# Patient Record
Sex: MALE | Race: WHITE | NOT HISPANIC OR LATINO | Employment: OTHER | URBAN - NONMETROPOLITAN AREA
[De-identification: names, ages, dates, MRNs, and addresses within clinical notes are randomized per-mention and may not be internally consistent; named-entity substitution may affect disease eponyms.]

---

## 2023-01-27 PROBLEM — G60.0 CMT (CHARCOT-MARIE-TOOTH DISEASE): Status: ACTIVE | Noted: 2023-01-27

## 2023-01-27 PROBLEM — J30.1 ALLERGIC RHINITIS DUE TO POLLEN: Status: ACTIVE | Noted: 2023-01-27

## 2023-01-27 PROBLEM — F32.A CHRONIC DEPRESSION: Status: ACTIVE | Noted: 2023-01-27

## 2023-01-27 PROBLEM — B34.9 VIRAL SYNDROME: Status: ACTIVE | Noted: 2023-01-27

## 2023-01-27 PROBLEM — K21.9 ESOPHAGEAL REFLUX: Status: ACTIVE | Noted: 2023-01-27

## 2023-01-27 PROBLEM — G62.9 PERIPHERAL NEUROPATHY: Status: ACTIVE | Noted: 2023-01-27

## 2023-01-27 PROBLEM — E03.9 HYPOTHYROIDISM: Status: ACTIVE | Noted: 2023-01-27

## 2023-01-27 PROBLEM — E78.00 HYPERCHOLESTEREMIA: Status: ACTIVE | Noted: 2023-01-27

## 2023-01-27 PROBLEM — G47.33 OBSTRUCTIVE SLEEP APNEA: Status: ACTIVE | Noted: 2023-01-27

## 2023-01-27 PROBLEM — G47.9: Status: ACTIVE | Noted: 2023-01-27

## 2023-01-27 PROBLEM — R35.0 URINARY FREQUENCY: Status: ACTIVE | Noted: 2023-01-27

## 2023-01-27 PROBLEM — L30.9 DERMATITIS, ECZEMATOID: Status: ACTIVE | Noted: 2023-01-27

## 2023-01-27 PROBLEM — E66.3 OVERWEIGHT WITH BODY MASS INDEX (BMI) OF 29 TO 29.9 IN ADULT: Status: ACTIVE | Noted: 2023-01-27

## 2023-01-27 PROBLEM — G47.00 INSOMNIA: Status: ACTIVE | Noted: 2023-01-27

## 2023-01-27 PROBLEM — E11.9 CONTROLLED TYPE 2 DIABETES MELLITUS (MULTI): Status: ACTIVE | Noted: 2023-01-27

## 2023-01-27 PROBLEM — E55.9 VITAMIN D DEFICIENCY: Status: ACTIVE | Noted: 2023-01-27

## 2023-01-27 PROBLEM — I10 BENIGN ESSENTIAL HYPERTENSION: Status: ACTIVE | Noted: 2023-01-27

## 2023-01-27 PROBLEM — N40.0 BPH (BENIGN PROSTATIC HYPERPLASIA): Status: ACTIVE | Noted: 2023-01-27

## 2023-01-27 PROBLEM — M15.9 OSTEOARTHRITIS, MULTIPLE SITES: Status: ACTIVE | Noted: 2023-01-27

## 2023-01-27 PROBLEM — M62.838 MUSCLE SPASM OF BOTH LOWER LEGS: Status: ACTIVE | Noted: 2023-01-27

## 2023-01-27 PROBLEM — R63.1 POLYDIPSIA: Status: ACTIVE | Noted: 2023-01-27

## 2023-01-27 RX ORDER — NABUMETONE 750 MG/1
1 TABLET, FILM COATED ORAL EVERY 12 HOURS
COMMUNITY
Start: 2012-05-22 | End: 2024-01-17

## 2023-01-27 RX ORDER — FLUTICASONE PROPIONATE 50 MCG
2 SPRAY, SUSPENSION (ML) NASAL DAILY
COMMUNITY
Start: 2016-03-30 | End: 2023-03-10

## 2023-01-27 RX ORDER — PREGABALIN 75 MG/1
1 CAPSULE ORAL 3 TIMES DAILY
COMMUNITY
Start: 2020-10-05 | End: 2023-06-28

## 2023-01-27 RX ORDER — BLOOD-GLUCOSE METER
KIT MISCELLANEOUS
COMMUNITY
Start: 2021-06-03

## 2023-01-27 RX ORDER — LEVOTHYROXINE SODIUM 100 UG/1
1 TABLET ORAL DAILY
COMMUNITY
Start: 2012-04-11 | End: 2023-07-21

## 2023-01-27 RX ORDER — TRAMADOL HYDROCHLORIDE 50 MG/1
50 TABLET ORAL EVERY 6 HOURS PRN
COMMUNITY
Start: 2012-05-22 | End: 2023-03-29

## 2023-01-27 RX ORDER — ERGOCALCIFEROL 1.25 MG/1
1 CAPSULE ORAL
COMMUNITY
Start: 2021-06-03 | End: 2023-12-04

## 2023-01-27 RX ORDER — ATORVASTATIN CALCIUM 80 MG/1
1 TABLET, FILM COATED ORAL NIGHTLY
COMMUNITY
Start: 2012-05-22 | End: 2024-01-17

## 2023-01-27 RX ORDER — CETIRIZINE HYDROCHLORIDE 10 MG/1
1 TABLET ORAL DAILY
COMMUNITY
Start: 2016-03-30

## 2023-01-27 RX ORDER — ASPIRIN 81 MG/1
1 TABLET ORAL DAILY
COMMUNITY
Start: 2022-03-10

## 2023-01-27 RX ORDER — AMLODIPINE BESYLATE 5 MG/1
1 TABLET ORAL DAILY
COMMUNITY
Start: 2019-12-20 | End: 2023-03-10 | Stop reason: DRUGHIGH

## 2023-01-27 RX ORDER — CLOBETASOL PROPIONATE 0.5 MG/G
CREAM TOPICAL
COMMUNITY
Start: 2019-03-18

## 2023-01-27 RX ORDER — IBUPROFEN 200 MG
CAPSULE ORAL
COMMUNITY
Start: 2018-01-12

## 2023-01-27 RX ORDER — OMEPRAZOLE 20 MG/1
1 CAPSULE, DELAYED RELEASE ORAL DAILY
COMMUNITY
Start: 2016-06-30

## 2023-01-27 RX ORDER — TAMSULOSIN HYDROCHLORIDE 0.4 MG/1
CAPSULE ORAL
COMMUNITY
End: 2023-03-10 | Stop reason: SDUPTHER

## 2023-01-27 RX ORDER — METFORMIN HYDROCHLORIDE 500 MG/1
1000 TABLET ORAL
COMMUNITY
Start: 2021-06-03 | End: 2023-12-26 | Stop reason: SDUPTHER

## 2023-03-10 ENCOUNTER — OFFICE VISIT (OUTPATIENT)
Dept: PRIMARY CARE | Facility: CLINIC | Age: 63
End: 2023-03-10
Payer: COMMERCIAL

## 2023-03-10 VITALS
DIASTOLIC BLOOD PRESSURE: 68 MMHG | HEIGHT: 69 IN | BODY MASS INDEX: 29.41 KG/M2 | OXYGEN SATURATION: 97 % | WEIGHT: 198.6 LBS | RESPIRATION RATE: 16 BRPM | HEART RATE: 97 BPM | SYSTOLIC BLOOD PRESSURE: 122 MMHG

## 2023-03-10 DIAGNOSIS — G47.33 OBSTRUCTIVE SLEEP APNEA: ICD-10-CM

## 2023-03-10 DIAGNOSIS — I10 BENIGN ESSENTIAL HYPERTENSION: Primary | ICD-10-CM

## 2023-03-10 DIAGNOSIS — N40.0 BENIGN PROSTATIC HYPERPLASIA WITHOUT LOWER URINARY TRACT SYMPTOMS: ICD-10-CM

## 2023-03-10 DIAGNOSIS — F32.A CHRONIC DEPRESSION: ICD-10-CM

## 2023-03-10 DIAGNOSIS — E11.9 CONTROLLED TYPE 2 DIABETES MELLITUS WITHOUT COMPLICATION, WITHOUT LONG-TERM CURRENT USE OF INSULIN (MULTI): ICD-10-CM

## 2023-03-10 DIAGNOSIS — E03.9 HYPOTHYROIDISM, UNSPECIFIED TYPE: ICD-10-CM

## 2023-03-10 DIAGNOSIS — G60.0 CMT (CHARCOT-MARIE-TOOTH DISEASE): ICD-10-CM

## 2023-03-10 PROCEDURE — 3074F SYST BP LT 130 MM HG: CPT | Performed by: FAMILY MEDICINE

## 2023-03-10 PROCEDURE — 99214 OFFICE O/P EST MOD 30 MIN: CPT | Performed by: FAMILY MEDICINE

## 2023-03-10 PROCEDURE — 1036F TOBACCO NON-USER: CPT | Performed by: FAMILY MEDICINE

## 2023-03-10 PROCEDURE — 3078F DIAST BP <80 MM HG: CPT | Performed by: FAMILY MEDICINE

## 2023-03-10 RX ORDER — AMLODIPINE BESYLATE 5 MG/1
2.5 TABLET ORAL DAILY
Qty: 45 TABLET | Refills: 1 | Status: SHIPPED | OUTPATIENT
Start: 2023-03-10 | End: 2023-03-10 | Stop reason: DRUGHIGH

## 2023-03-10 RX ORDER — AMLODIPINE BESYLATE 5 MG/1
2.5 TABLET ORAL DAILY
COMMUNITY
End: 2023-04-04

## 2023-03-10 RX ORDER — AMLODIPINE BESYLATE 5 MG/1
2.5 TABLET ORAL DAILY
Qty: 45 TABLET | Refills: 1 | Status: SHIPPED | OUTPATIENT
Start: 2023-03-10 | End: 2023-03-10 | Stop reason: SINTOL

## 2023-03-10 RX ORDER — TAMSULOSIN HYDROCHLORIDE 0.4 MG/1
0.4 CAPSULE ORAL DAILY
Qty: 90 CAPSULE | Refills: 3 | Status: SHIPPED | OUTPATIENT
Start: 2023-03-10 | End: 2024-03-01
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
## 2023-03-29 ENCOUNTER — TELEPHONE (OUTPATIENT)
Dept: PRIMARY CARE | Facility: CLINIC | Age: 63
End: 2023-03-29
Payer: COMMERCIAL

## 2023-03-29 DIAGNOSIS — M15.9 PRIMARY OSTEOARTHRITIS INVOLVING MULTIPLE JOINTS: Primary | ICD-10-CM

## 2023-03-29 RX ORDER — TRAMADOL HYDROCHLORIDE 50 MG/1
TABLET ORAL
Qty: 360 TABLET | Refills: 0 | Status: SHIPPED | OUTPATIENT
Start: 2023-03-29 | End: 2023-07-11

## 2023-03-31 NOTE — TELEPHONE ENCOUNTER
This is [facility] calling.  The pregabalin 75mg is no longer covered by the patient's insurance.  The preferred is gabapentin tablets or capsules.  Please send an e script OR call us using reference number [id number] at [phone].

## 2023-04-03 ENCOUNTER — PATIENT MESSAGE (OUTPATIENT)
Dept: PRIMARY CARE | Facility: CLINIC | Age: 63
End: 2023-04-03
Payer: COMMERCIAL

## 2023-04-04 DIAGNOSIS — I10 BENIGN ESSENTIAL HYPERTENSION: Primary | ICD-10-CM

## 2023-04-04 RX ORDER — AMLODIPINE BESYLATE 5 MG/1
TABLET ORAL
Qty: 90 TABLET | Refills: 3 | Status: SHIPPED | OUTPATIENT
Start: 2023-04-04 | End: 2023-10-02 | Stop reason: SINTOL

## 2023-06-12 ENCOUNTER — APPOINTMENT (OUTPATIENT)
Dept: PRIMARY CARE | Facility: CLINIC | Age: 63
End: 2023-06-12
Payer: COMMERCIAL

## 2023-06-27 DIAGNOSIS — G62.89 OTHER POLYNEUROPATHY: Primary | ICD-10-CM

## 2023-06-28 RX ORDER — PREGABALIN 75 MG/1
CAPSULE ORAL
Qty: 270 CAPSULE | Refills: 1 | Status: SHIPPED | OUTPATIENT
Start: 2023-06-28 | End: 2023-12-28

## 2023-06-29 ENCOUNTER — OFFICE VISIT (OUTPATIENT)
Dept: PRIMARY CARE | Facility: CLINIC | Age: 63
End: 2023-06-29
Payer: COMMERCIAL

## 2023-06-29 VITALS
BODY MASS INDEX: 29.09 KG/M2 | DIASTOLIC BLOOD PRESSURE: 80 MMHG | HEART RATE: 86 BPM | WEIGHT: 197 LBS | SYSTOLIC BLOOD PRESSURE: 128 MMHG | OXYGEN SATURATION: 97 %

## 2023-06-29 DIAGNOSIS — G60.0 CMT (CHARCOT-MARIE-TOOTH DISEASE): ICD-10-CM

## 2023-06-29 DIAGNOSIS — F32.A CHRONIC DEPRESSION: ICD-10-CM

## 2023-06-29 DIAGNOSIS — Z79.899 ENCOUNTER FOR LONG-TERM (CURRENT) USE OF HIGH-RISK MEDICATION: Primary | ICD-10-CM

## 2023-06-29 DIAGNOSIS — I10 BENIGN ESSENTIAL HYPERTENSION: ICD-10-CM

## 2023-06-29 DIAGNOSIS — E11.9 CONTROLLED TYPE 2 DIABETES MELLITUS WITHOUT COMPLICATION, WITHOUT LONG-TERM CURRENT USE OF INSULIN (MULTI): ICD-10-CM

## 2023-06-29 DIAGNOSIS — G47.33 OBSTRUCTIVE SLEEP APNEA: ICD-10-CM

## 2023-06-29 DIAGNOSIS — E03.9 HYPOTHYROIDISM, UNSPECIFIED TYPE: ICD-10-CM

## 2023-06-29 LAB
ALANINE AMINOTRANSFERASE (SGPT) (U/L) IN SER/PLAS: 34 U/L (ref 10–52)
ALBUMIN (G/DL) IN SER/PLAS: 4 G/DL (ref 3.4–5)
ALKALINE PHOSPHATASE (U/L) IN SER/PLAS: 74 U/L (ref 33–136)
ANION GAP IN SER/PLAS: 13 MMOL/L (ref 10–20)
ASPARTATE AMINOTRANSFERASE (SGOT) (U/L) IN SER/PLAS: 22 U/L (ref 9–39)
BILIRUBIN TOTAL (MG/DL) IN SER/PLAS: 0.8 MG/DL (ref 0–1.2)
CALCIUM (MG/DL) IN SER/PLAS: 8.5 MG/DL (ref 8.6–10.3)
CARBON DIOXIDE, TOTAL (MMOL/L) IN SER/PLAS: 25 MMOL/L (ref 21–32)
CHLORIDE (MMOL/L) IN SER/PLAS: 103 MMOL/L (ref 98–107)
CHOLESTEROL (MG/DL) IN SER/PLAS: 120 MG/DL (ref 0–199)
CHOLESTEROL IN HDL (MG/DL) IN SER/PLAS: 33.1 MG/DL
CHOLESTEROL/HDL RATIO: 3.6
CREATININE (MG/DL) IN SER/PLAS: 0.9 MG/DL (ref 0.5–1.3)
GFR MALE: >90 ML/MIN/1.73M2
GLUCOSE (MG/DL) IN SER/PLAS: 125 MG/DL (ref 74–99)
LDL: 68 MG/DL (ref 0–99)
POC HEMOGLOBIN A1C: 6.4 % (ref 4.2–6.5)
POTASSIUM (MMOL/L) IN SER/PLAS: 4 MMOL/L (ref 3.5–5.3)
PROTEIN TOTAL: 6.4 G/DL (ref 6.4–8.2)
SODIUM (MMOL/L) IN SER/PLAS: 137 MMOL/L (ref 136–145)
THYROTROPIN (MIU/L) IN SER/PLAS BY DETECTION LIMIT <= 0.05 MIU/L: 1.89 MIU/L (ref 0.44–3.98)
TRIGLYCERIDE (MG/DL) IN SER/PLAS: 95 MG/DL (ref 0–149)
UREA NITROGEN (MG/DL) IN SER/PLAS: 12 MG/DL (ref 6–23)
VLDL: 19 MG/DL (ref 0–40)

## 2023-06-29 PROCEDURE — 80346 BENZODIAZEPINES1-12: CPT

## 2023-06-29 PROCEDURE — 80354 DRUG SCREENING FENTANYL: CPT

## 2023-06-29 PROCEDURE — 3079F DIAST BP 80-89 MM HG: CPT | Performed by: FAMILY MEDICINE

## 2023-06-29 PROCEDURE — 80361 OPIATES 1 OR MORE: CPT

## 2023-06-29 PROCEDURE — 3074F SYST BP LT 130 MM HG: CPT | Performed by: FAMILY MEDICINE

## 2023-06-29 PROCEDURE — 80061 LIPID PANEL: CPT

## 2023-06-29 PROCEDURE — 80368 SEDATIVE HYPNOTICS: CPT

## 2023-06-29 PROCEDURE — 80365 DRUG SCREENING OXYCODONE: CPT

## 2023-06-29 PROCEDURE — 83036 HEMOGLOBIN GLYCOSYLATED A1C: CPT | Performed by: FAMILY MEDICINE

## 2023-06-29 PROCEDURE — 82043 UR ALBUMIN QUANTITATIVE: CPT

## 2023-06-29 PROCEDURE — 80053 COMPREHEN METABOLIC PANEL: CPT

## 2023-06-29 PROCEDURE — 80307 DRUG TEST PRSMV CHEM ANLYZR: CPT

## 2023-06-29 PROCEDURE — 99214 OFFICE O/P EST MOD 30 MIN: CPT | Performed by: FAMILY MEDICINE

## 2023-06-29 PROCEDURE — 82570 ASSAY OF URINE CREATININE: CPT

## 2023-06-29 PROCEDURE — 1036F TOBACCO NON-USER: CPT | Performed by: FAMILY MEDICINE

## 2023-06-29 PROCEDURE — 80373 DRUG SCREENING TRAMADOL: CPT

## 2023-06-29 PROCEDURE — 84443 ASSAY THYROID STIM HORMONE: CPT

## 2023-06-29 PROCEDURE — 80358 DRUG SCREENING METHADONE: CPT
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
## 2023-06-29 NOTE — PATIENT INSTRUCTIONS
"Look into if Prevnar 20 is covered for you - we can do it at the next appt if it is       Check with your insurance on the Shingrix vaccine-  see if they cover it and where they want you to get it.   If our office - you can make a nurse appt for it -  let me know and I will place the order after you have an appt.   If the pharmacy - then check with the pharmacy to see if you need an appointment.    Its two injections -  2 - 6 months apart.         You will always hear about your test results.       The easiest way, if you have a smart phone or computer access, is to sign up for \"My Chart.\"    The electronic way to see your medical record, test results and visit summaries/instructions.   You will see your test results on this system before I do.   But, I will always make comments on the results when I see them.   If over a week goes by and I have not made comments on them,  please let me know.    Something may have gone wrong and I did not see them.    If you are having issues with getting onto My Chart , the patient support  number is [phone].       If you do not have a smart phone or computer access, I can still leave test results on your Secureach card if you have one,   or we can call or mail you your results.   IF a week goes by and you have not heard about your results,  please let me know.      Sleep Hygiene:     I WOULD LOVE FOR YOU TO SEE DR PENN A SLEEP SPECIALIST     If you are having trouble sleeping, you will want to follow these tips:    1) Have a set bedtime and awakening time that you want to stick to every night.      2)  Avoid caffeine - some people can just stop 4 - 5 hours before bedtime, but many people are sensitive to even a small amount in the day.  Remember -  coffee, some teas, energy drinks, diet medications, and chocolate are all known to contain caffeine.       3) Create a \"think about tomorrow\" notepad to keep by your bed.  When you are lying in bed thinking about something that you " "can't get out of your mind, write it down.  This helps your brain \"let it go\" for the night.      4)  A cool room often helps.      5) If you are lying in bed and just can't fall asleep, then get out of bed.  Find a calm and quiet place, and read something that is VERY BORING until you feel sleepy.  Then go back to bed.  Keep this pattern up until you fall asleep.  Don't forget to wake up at your set awakening time - even if you only fell asleep 2 hours before!   This will help you fall asleep the next night.       6) No napping in the day.      7) White noise often helps - a fan usually works great     8) And finally  - no electronics one hour before bed, and definitely not any in the night.  The light from them stimulates the awake center if the brain.          For General Healthy Nutrition    (Remember - NOT A DIET!   Diets are only good for class reunions.)    These are my general good nutrition recommendations for most people.   I use the term \" diet \"  in these instructions to mean your overall nutrition - how you eat and drink.   If we talked about something different during your visit with me,  other than what is written below,  follow that advice instead.       For most people,  eating healthier means getting less added sugar and less processed foods in your diet    The fresher the better.    Added sugar is now a part of the nutrition label on manufactured food, so you can keep an eye on it easier.    But basically,  foods and beverages  that contain regular sugar and corn syrup are the main sources of added sugars.  Eating as little of these foods as you can is best.   One shocking example of the epidemic of added sugar is soda.    One can of regular soda contains about as much added sugar as 3 regular size doughnuts!     The other issue with processed foods is the amount of processed grains they contain , such as white flour.    This is also something you want to try to limit in your diet.     But, grain " products are very important for your nutrition.    Whole grains are better for your body.     Cutting back on white breads, traditional pasta, baked goods, white rice,  and processed cereals will be healthier for you.   The better choices include whole grain breads,  whole wheat pasta,  brown rice, quinoa, barley, steel cut  or rolled oats.   If you eat cereal for breakfast, try to look for one made with whole grains and less sugar.   There are many people who have a problem with gluten, for a large variety of reasons.    Generally,  products made with wheat flour , barley or rye are the primary source of gluten.       Cutting back on saturated fats is important.    You want the majority of the meat that you eat to be chicken, fish or turkey.   Baked or broiled is best -  fried adds too much fat.    There are healthy fats that are important - fat is important for holding down appetite, vitamin absorption and several metabolic processes in the body.  Monounsaturated fats raise HDL (good cholesterol) and lower LDL (bad cholesterol).   Olive oil, peanut oil, nuts, seeds, and avocados are great sources of the good fats.       Ideas are:   Trade sour cream dip for hummus (which is rich in olive oil) or guacamole; use veggies or whole-wheat chips to dip.    Nuts are an excellent source of protein and healthy fats.   Tree nuts are the best kind, such as almonds or walnuts.   Just be careful - they are high in calories, so stick to a serving size.  (Most are about 200 calories for a 1/4 cup)      Proteins are very important for your body, and they also hold down your appetite.   Try to have protein with every meal.    These generally are meats, nuts, many beans, legumes, eggs, and dairy.   You will find protein in whole grain products and some green vegetables have a little too.     When you have dairy (if you can - many people are lactose intolerant) try to make it low fat.    Ideas are 1% milk, lowfat yogurt or cheeses,  "low fat cottage cheese.   I don't generally recommend FAT FREE because they often contain artificial products to improve taste, and the fat helps hold down your appetite.   If you are lactose intolerant, try to see if taking Lactaid before having dairy helps.      Fresh fruits and vegetables are VERY important.  The brighter the better.   Many vegetables are considered \"Free Foods\" - meaning you can eat as much as you want, and it does not matter.  These include tomatoes, cucumbers, celery, peppers, all the various lettuces and kale - to name a few.   Potatoes, corn and peas are starchy, so do have more calories, but are still healthy - you just want to watch the amount of them you eat.       Fruits are full of wonderful nutrition.   They contain natural sugar called fructose, so eating them in moderation is best.   Diabetics may need to pay careful attention to how their body reacts to the sugar.  Some fruits might drastically increase their blood sugar.      Eating smaller meals with a couple of small snacks is better for your metabolism than not eating for long amounts of time  (breakfast is very important).   Trying to avoid large meals is helpful too.    Eating like this helps keep your appetite down and keeps you in burning metabolism rather than storage metabolism so your body will use the calories you eat.       I do not tell people to stop eating sweets or snack foods - just limit the amounts you have.  The less the better.   Pay attention to serving sizes, and treat them as a treat.        Foods like doughnuts, pop tarts, sugar cereals, cookies  ARE NOT GOOD FOR BREAKFAST.   They are loaded with sugar and will cause you to be hungrier in the day and often not feel well.    Caffeine needs to be limited - no more than 2 servings a day.  Some people can't have any at all.    (if you have any sleep or anxiety issues - stop the caffeine)   Coffee, many teas, many sodas, energy drinks, almost any diet " supplement,  and chocolate all contain caffeine.      Water is important.   For most people, 8   x  8 ounces  a day are needed.  This may vary for some health issues.    If you need to be on a low sodium diet, that means looking at labels and eating only 1000 - 2000 mg of sodium a day.    Calcium intake is important.  3 servings of a high calcium food or drink a day is recommended.   This is usually a cup of milk, a cup of yogurt, an ounce of a hard cheese or 1.5 ounces of a soft cheese are the usual servings.   There are other high calcium foods - including soy or almond milk, broccoli,  almonds, dark green leafy vegetable.   Make sure you are not getting more than 1000  - 1200 mg of total calcium a day (unless you have been told you need more by a doctor).    Vitamin D 3 is important to absorb the calcium and for your immune system.   For children, 400 IU a day is recommended.   For adults - 800 - 5000 IU a day  is recommended.  (Often the amount needed is individualized for adults - be sure to ask how much is right for you)    Physical activity is very important for good health.    Finding activities that give you regular exercise is very important for good health.  Try to find exercise you enjoy doing on a regular basis.    30 minutes at least 5 days a week of a good cardiovascular exercise is recommended.   That means something that gets your heart rate going faster than your usual baseline and you can find yourself breathing harder than usual while you are exercising.  If you have not done any exercise in a long time,  make sure you ask if its safe for you to start,  and be sure to gradually work up to your goal.      If you need to lose weight,  following these recommendations will help you.   And if you are doing all of this and still not losing weight, then its likely just the amount of food you are eating.   Learn to cut back on portion sizes.  Using smaller plates may help.  Healthy weight loss is  only  about a pound a week.   You have to remember that whatever you do to lose the weight, you must be prepared to keep it up for life for the weight to stay off.     A lot of people have a lot of luck with using something like a fit bit,  or a program where you keep track of all of your calories that you eat and what you burn off in the day.

## 2023-06-30 LAB
ALBUMIN (MG/L) IN URINE: <7 MG/L
ALBUMIN/CREATININE (UG/MG) IN URINE: NORMAL UG/MG CRT (ref 0–30)
CREATININE (MG/DL) IN URINE: 35.5 MG/DL (ref 20–370)

## 2023-07-03 LAB
6-ACETYLMORPHINE: <25 NG/ML
7-AMINOCLONAZEPAM: <25 NG/ML
ALPHA-HYDROXYALPRAZOLAM: <25 NG/ML
ALPHA-HYDROXYMIDAZOLAM: <25 NG/ML
ALPRAZOLAM: <25 NG/ML
AMPHETAMINE (PRESENCE) IN URINE BY SCREEN METHOD: ABNORMAL
BARBITURATES PRESENCE IN URINE BY SCREEN METHOD: ABNORMAL
CANNABINOIDS IN URINE BY SCREEN METHOD: ABNORMAL
CHLORDIAZEPOXIDE: <25 NG/ML
CLONAZEPAM: <25 NG/ML
COCAINE (PRESENCE) IN URINE BY SCREEN METHOD: ABNORMAL
CODEINE: <50 NG/ML
CREATINE, URINE FOR DRUG: 35.4 MG/DL
DIAZEPAM: <25 NG/ML
DRUG SCREEN COMMENT URINE: ABNORMAL
EDDP: <25 NG/ML
FENTANYL CONFIRMATION, URINE: <2.5 NG/ML
HYDROCODONE: <25 NG/ML
HYDROMORPHONE: <25 NG/ML
LORAZEPAM: <25 NG/ML
METHADONE CONFIRMATION,URINE: <25 NG/ML
MIDAZOLAM: <25 NG/ML
MORPHINE URINE: <50 NG/ML
NORDIAZEPAM: <25 NG/ML
NORFENTANYL: <2.5 NG/ML
NORHYDROCODONE: <25 NG/ML
NOROXYCODONE: <25 NG/ML
O-DESMETHYLTRAMADOL: >1000 NG/ML
OXAZEPAM: <25 NG/ML
OXYCODONE: <25 NG/ML
OXYMORPHONE: <25 NG/ML
PHENCYCLIDINE (PRESENCE) IN URINE BY SCREEN METHOD: ABNORMAL
TEMAZEPAM: <25 NG/ML
TRAMADOL: >1000 NG/ML
ZOLPIDEM METABOLITE (ZCA): <25 NG/ML
ZOLPIDEM: <25 NG/ML

## 2023-07-10 DIAGNOSIS — M15.9 PRIMARY OSTEOARTHRITIS INVOLVING MULTIPLE JOINTS: ICD-10-CM

## 2023-07-11 RX ORDER — TRAMADOL HYDROCHLORIDE 50 MG/1
TABLET ORAL
Qty: 360 TABLET | Refills: 0 | Status: SHIPPED | OUTPATIENT
Start: 2023-07-11 | End: 2023-11-14

## 2023-07-21 DIAGNOSIS — E03.9 HYPOTHYROIDISM, UNSPECIFIED TYPE: Primary | ICD-10-CM

## 2023-07-21 RX ORDER — LEVOTHYROXINE SODIUM 100 UG/1
100 TABLET ORAL DAILY
Qty: 90 TABLET | Refills: 3 | Status: SHIPPED | OUTPATIENT
Start: 2023-07-21

## 2023-10-02 ENCOUNTER — OFFICE VISIT (OUTPATIENT)
Dept: PRIMARY CARE | Facility: CLINIC | Age: 63
End: 2023-10-02
Payer: COMMERCIAL

## 2023-10-02 VITALS
BODY MASS INDEX: 28.35 KG/M2 | DIASTOLIC BLOOD PRESSURE: 64 MMHG | SYSTOLIC BLOOD PRESSURE: 112 MMHG | OXYGEN SATURATION: 100 % | HEART RATE: 102 BPM | WEIGHT: 192 LBS

## 2023-10-02 DIAGNOSIS — I48.91 ATRIAL FIBRILLATION, UNSPECIFIED TYPE (MULTI): ICD-10-CM

## 2023-10-02 DIAGNOSIS — I10 BENIGN ESSENTIAL HYPERTENSION: Primary | ICD-10-CM

## 2023-10-02 DIAGNOSIS — I49.9 IRREGULAR HEART BEAT: ICD-10-CM

## 2023-10-02 DIAGNOSIS — E11.8 CONTROLLED TYPE 2 DIABETES MELLITUS WITH COMPLICATION, WITHOUT LONG-TERM CURRENT USE OF INSULIN (MULTI): ICD-10-CM

## 2023-10-02 DIAGNOSIS — G47.33 OBSTRUCTIVE SLEEP APNEA: ICD-10-CM

## 2023-10-02 PROCEDURE — 90686 IIV4 VACC NO PRSV 0.5 ML IM: CPT | Performed by: FAMILY MEDICINE

## 2023-10-02 PROCEDURE — 1036F TOBACCO NON-USER: CPT | Performed by: FAMILY MEDICINE

## 2023-10-02 PROCEDURE — 93000 ELECTROCARDIOGRAM COMPLETE: CPT | Performed by: FAMILY MEDICINE

## 2023-10-02 PROCEDURE — 90471 IMMUNIZATION ADMIN: CPT | Performed by: FAMILY MEDICINE

## 2023-10-02 PROCEDURE — 99215 OFFICE O/P EST HI 40 MIN: CPT | Performed by: FAMILY MEDICINE

## 2023-10-02 PROCEDURE — 3078F DIAST BP <80 MM HG: CPT | Performed by: FAMILY MEDICINE

## 2023-10-02 PROCEDURE — 3074F SYST BP LT 130 MM HG: CPT | Performed by: FAMILY MEDICINE

## 2023-10-02 RX ORDER — AMLODIPINE BESYLATE 2.5 MG/1
2.5 TABLET ORAL DAILY
Qty: 90 TABLET | Refills: 3 | Status: SHIPPED | OUTPATIENT
Start: 2023-10-02 | End: 2023-10-02 | Stop reason: ALTCHOICE

## 2023-10-02 RX ORDER — METOPROLOL TARTRATE 25 MG/1
25 TABLET, FILM COATED ORAL 2 TIMES DAILY
Qty: 60 TABLET | Refills: 5 | Status: SHIPPED | OUTPATIENT
Start: 2023-10-02 | End: 2023-11-02
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
## 2023-10-02 NOTE — PATIENT INSTRUCTIONS
"Start Metoprolol 25 mg twice a day -   You need to see if this will calm the heart rate down -   You need to be able to listen to your heart to see if this has worked -   I need to hear from you if not    If you start getting chest pain or shortness of breat - call 911.     We also have to start Eliquis 5 mg  - one every 12 hours to thin the blood -   You can stop the baby aspirin a day .      Stop caffeine  - or at least cut down     Work on better sleep and wearing the CPAP     Sleep Hygiene:     If you are having trouble sleeping, you will want to follow these tips:    1) Have a set bedtime and awakening time that you want to stick to every night.      2)  Avoid caffeine - some people can just stop 4 - 5 hours before bedtime, but many people are sensitive to even a small amount in the day.  Remember -  coffee, some teas, energy drinks, diet medications, and chocolate are all known to contain caffeine.       3) Create a \"think about tomorrow\" notepad to keep by your bed.  When you are lying in bed thinking about something that you can't get out of your mind, write it down.  This helps your brain \"let it go\" for the night.      4)  A cool room often helps.      5) If you are lying in bed and just can't fall asleep, then get out of bed.  Find a calm and quiet place, and read something that is VERY BORING until you feel sleepy.  Then go back to bed.  Keep this pattern up until you fall asleep.  Don't forget to wake up at your set awakening time - even if you only fell asleep 2 hours before!   This will help you fall asleep the next night.       6) No napping in the day.      7) White noise often helps - a fan usually works great     8) And finally  - no electronics one hour before bed, and definitely not any in the night.  The light from them stimulates the awake center if the brain.         Let me know tomorrow what the heart is doing - LEAVE ME A MESSAGE ON THE VOICEMAIL         You will always hear about your test " "results.     The easiest way, if you have a smart phone or computer access, is to sign up for \"My Chart.\"    The electronic way to see your medical record, test results and visit summaries/instructions.   You will see your test results on this system before I do.   But, I will always make comments on the results when I see them.   If over a week goes by and I have not made comments on them,  please let me know.    Something may have gone wrong and I did not see them.    If you are having issues with getting onto My Chart , the patient support  number is [phone].       If you do not have a smart phone or computer access, I can still leave test results on your Secureach card if you have one,   or we can call or mail you your results.   IF a week goes by and you have not heard about your results,  please let me know.            "

## 2023-10-03 ENCOUNTER — DOCUMENTATION (OUTPATIENT)
Dept: PRIMARY CARE | Facility: CLINIC | Age: 63
End: 2023-10-03
Payer: COMMERCIAL

## 2023-10-03 DIAGNOSIS — F32.A CHRONIC DEPRESSION: Primary | ICD-10-CM

## 2023-10-03 RX ORDER — VORTIOXETINE 20 MG/1
20 TABLET, FILM COATED ORAL DAILY
Qty: 90 TABLET | Refills: 2 | Status: SHIPPED | OUTPATIENT
Start: 2023-10-03 | End: 2024-03-26 | Stop reason: SDUPTHER

## 2023-10-03 NOTE — PROGRESS NOTES
I called pt -   HR has slowed down -   He is listening to his heart     60  - 70 BPM    Has taken Metoprolol twice so far     Will get CPAP out tonight     To call about getting Eliquis     Feeling ok

## 2023-10-04 ENCOUNTER — TELEPHONE (OUTPATIENT)
Dept: PRIMARY CARE | Facility: CLINIC | Age: 63
End: 2023-10-04
Payer: COMMERCIAL

## 2023-10-25 ENCOUNTER — TELEPHONE (OUTPATIENT)
Dept: PRIMARY CARE | Facility: CLINIC | Age: 63
End: 2023-10-25
Payer: COMMERCIAL

## 2023-10-25 DIAGNOSIS — I48.91 ATRIAL FIBRILLATION, UNSPECIFIED TYPE (MULTI): Primary | ICD-10-CM

## 2023-10-25 NOTE — TELEPHONE ENCOUNTER
I did get a card for me to get the eliquis.  I guess all I need for you to do is send in a prescription for it to [facility].  Once you do that I can go get it with this card.

## 2023-10-26 NOTE — TELEPHONE ENCOUNTER
Please call pt - tell him I sent RX     When on the blood thinner -    At higher risk for bleeding  -     Take less nambutone  -  it also increases bleeding risk

## 2023-11-01 DIAGNOSIS — I48.91 ATRIAL FIBRILLATION, UNSPECIFIED TYPE (MULTI): ICD-10-CM

## 2023-11-02 RX ORDER — METOPROLOL TARTRATE 25 MG/1
25 TABLET, FILM COATED ORAL 2 TIMES DAILY
Qty: 180 TABLET | Refills: 0 | Status: SHIPPED | OUTPATIENT
Start: 2023-11-02 | End: 2024-03-01

## 2023-11-13 DIAGNOSIS — M15.9 PRIMARY OSTEOARTHRITIS INVOLVING MULTIPLE JOINTS: ICD-10-CM

## 2023-11-14 RX ORDER — TRAMADOL HYDROCHLORIDE 50 MG/1
50 TABLET ORAL EVERY 6 HOURS PRN
Qty: 360 TABLET | Refills: 0 | Status: SHIPPED | OUTPATIENT
Start: 2023-11-14 | End: 2024-03-08

## 2023-12-04 DIAGNOSIS — E55.9 VITAMIN D DEFICIENCY: Primary | ICD-10-CM

## 2023-12-04 RX ORDER — ERGOCALCIFEROL 1.25 MG/1
1 CAPSULE ORAL
Qty: 13 CAPSULE | Refills: 3 | Status: SHIPPED | OUTPATIENT
Start: 2023-12-04

## 2023-12-07 ENCOUNTER — OFFICE VISIT (OUTPATIENT)
Dept: PRIMARY CARE | Facility: CLINIC | Age: 63
End: 2023-12-07
Payer: COMMERCIAL

## 2023-12-07 VITALS
OXYGEN SATURATION: 97 % | HEART RATE: 67 BPM | DIASTOLIC BLOOD PRESSURE: 64 MMHG | SYSTOLIC BLOOD PRESSURE: 118 MMHG | WEIGHT: 195 LBS | BODY MASS INDEX: 28.8 KG/M2

## 2023-12-07 DIAGNOSIS — G60.0 CMT (CHARCOT-MARIE-TOOTH DISEASE): ICD-10-CM

## 2023-12-07 DIAGNOSIS — E11.8 CONTROLLED TYPE 2 DIABETES MELLITUS WITH COMPLICATION, WITHOUT LONG-TERM CURRENT USE OF INSULIN (MULTI): ICD-10-CM

## 2023-12-07 DIAGNOSIS — I48.0 PAROXYSMAL A-FIB (MULTI): Primary | ICD-10-CM

## 2023-12-07 LAB
ANION GAP SERPL CALC-SCNC: 13 MMOL/L (ref 10–20)
BASOPHILS # BLD AUTO: 0.05 X10*3/UL (ref 0–0.1)
BASOPHILS NFR BLD AUTO: 0.6 %
BUN SERPL-MCNC: 20 MG/DL (ref 6–23)
CALCIUM SERPL-MCNC: 9.1 MG/DL (ref 8.6–10.3)
CHLORIDE SERPL-SCNC: 102 MMOL/L (ref 98–107)
CO2 SERPL-SCNC: 27 MMOL/L (ref 21–32)
CREAT SERPL-MCNC: 0.92 MG/DL (ref 0.5–1.3)
EOSINOPHIL # BLD AUTO: 0.84 X10*3/UL (ref 0–0.7)
EOSINOPHIL NFR BLD AUTO: 10.4 %
ERYTHROCYTE [DISTWIDTH] IN BLOOD BY AUTOMATED COUNT: 13.1 % (ref 11.5–14.5)
GFR SERPL CREATININE-BSD FRML MDRD: >90 ML/MIN/1.73M*2
GLUCOSE SERPL-MCNC: 144 MG/DL (ref 74–99)
HCT VFR BLD AUTO: 43.2 % (ref 41–52)
HGB BLD-MCNC: 14.3 G/DL (ref 13.5–17.5)
IMM GRANULOCYTES # BLD AUTO: 0.02 X10*3/UL (ref 0–0.7)
IMM GRANULOCYTES NFR BLD AUTO: 0.2 % (ref 0–0.9)
LYMPHOCYTES # BLD AUTO: 2.26 X10*3/UL (ref 1.2–4.8)
LYMPHOCYTES NFR BLD AUTO: 28.1 %
MCH RBC QN AUTO: 29.1 PG (ref 26–34)
MCHC RBC AUTO-ENTMCNC: 33.1 G/DL (ref 32–36)
MCV RBC AUTO: 88 FL (ref 80–100)
MONOCYTES # BLD AUTO: 0.53 X10*3/UL (ref 0.1–1)
MONOCYTES NFR BLD AUTO: 6.6 %
NEUTROPHILS # BLD AUTO: 4.35 X10*3/UL (ref 1.2–7.7)
NEUTROPHILS NFR BLD AUTO: 54.1 %
NRBC BLD-RTO: 0 /100 WBCS (ref 0–0)
PLATELET # BLD AUTO: 172 X10*3/UL (ref 150–450)
POC HEMOGLOBIN A1C: 6.1 % (ref 4.2–6.5)
POTASSIUM SERPL-SCNC: 4.1 MMOL/L (ref 3.5–5.3)
RBC # BLD AUTO: 4.92 X10*6/UL (ref 4.5–5.9)
SODIUM SERPL-SCNC: 138 MMOL/L (ref 136–145)
WBC # BLD AUTO: 8.1 X10*3/UL (ref 4.4–11.3)

## 2023-12-07 PROCEDURE — 99213 OFFICE O/P EST LOW 20 MIN: CPT | Performed by: FAMILY MEDICINE

## 2023-12-07 PROCEDURE — 3074F SYST BP LT 130 MM HG: CPT | Performed by: FAMILY MEDICINE

## 2023-12-07 PROCEDURE — 80048 BASIC METABOLIC PNL TOTAL CA: CPT

## 2023-12-07 PROCEDURE — 1036F TOBACCO NON-USER: CPT | Performed by: FAMILY MEDICINE

## 2023-12-07 PROCEDURE — 36415 COLL VENOUS BLD VENIPUNCTURE: CPT

## 2023-12-07 PROCEDURE — 85025 COMPLETE CBC W/AUTO DIFF WBC: CPT

## 2023-12-07 PROCEDURE — 3078F DIAST BP <80 MM HG: CPT | Performed by: FAMILY MEDICINE

## 2023-12-07 PROCEDURE — 83036 HEMOGLOBIN GLYCOSYLATED A1C: CPT | Performed by: FAMILY MEDICINE

## 2023-12-07 NOTE — PROGRESS NOTES
Subjective   Patient ID: Morris Aquino is a 63 y.o. male who presents for Follow-up.    HPI     Last appt in OCT - I normally see him every 3 mos -   But at last appt was found to be in Afib RVR -     Was stable - he preferred to not go to the hospital -   I started metoprolol  25 mg BID  and Eliquis     As of today -   Periodically listening to his own  heart and checking pulse  -   Pulse runs in the 60's usually ,   He periodically hears it out of rhythm     He is trying to wear his CPAP more   Last night was able to sleep 6 hours with it!!    I urged less caffeine     Has another kitten!   That's 5 now!      Trying to figure out Medicare  - eligible in January     Needs disability placard RX     Stable otherwise  - no other changes       Review of Systems    Objective   /64 (BP Location: Right arm, Patient Position: Sitting, BP Cuff Size: Large adult)   Pulse 67   Wt 88.5 kg (195 lb)   SpO2 97%   BMI 28.80 kg/m²     Physical Exam  Vitals reviewed.   Constitutional:       Appearance: Normal appearance.   HENT:      Head: Normocephalic and atraumatic.   Cardiovascular:      Rate and Rhythm: Normal rate and regular rhythm.   Pulmonary:      Effort: Pulmonary effort is normal.      Breath sounds: Normal breath sounds.   Musculoskeletal:         General: No swelling.   Neurological:      Mental Status: He is alert.         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    Relevant Orders    Disability Placard    Controlled type 2 diabetes mellitus (CMS/HCC) E11.9    Relevant Orders    POCT glycosylated hemoglobin (Hb A1C) manually resulted    Basic Metabolic Panel    CBC and Auto Differential     Other Visit Diagnoses         Codes    Paroxysmal A-fib (CMS/HCC)    -  Primary I48.0          HR more stable -   Agrees to cardiac work up when on medicare next year     Working on wearing CPAP more     Agrees to labs to check on  sugars     No med changes     Rx disability placard    We discussed at visit any disease processes that were of concern as well as the risks, benefits and instructions of any new medication provided.    See orders and discussion section for information handed to patient on their Clinical Summary.   Patient (and/or caretaker of patient if present)  stated all questions were answered, and they voiced understanding of instructions.

## 2023-12-07 NOTE — PATIENT INSTRUCTIONS
"Please get in touch with [facility] to talk to someone who can help you with medicare       Keep working on wearing the CPAP every night and working on better sleep       Sleep Hygiene:     If you are having trouble sleeping, you will want to follow these tips:    1) Have a set bedtime and awakening time that you want to stick to every night.      2)  Avoid caffeine - some people can just stop 4 - 5 hours before bedtime, but many people are sensitive to even a small amount in the day.  Remember -  coffee, some teas, energy drinks, diet medications, and chocolate are all known to contain caffeine.       3) Create a \"think about tomorrow\" notepad to keep by your bed.  When you are lying in bed thinking about something that you can't get out of your mind, write it down.  This helps your brain \"let it go\" for the night.      4)  A cool room often helps.      5) If you are lying in bed and just can't fall asleep, then get out of bed.  Find a calm and quiet place, and read something that is VERY BORING until you feel sleepy.  Then go back to bed.  Keep this pattern up until you fall asleep.  Don't forget to wake up at your set awakening time - even if you only fell asleep 2 hours before!   This will help you fall asleep the next night.       6) No napping in the day.      7) White noise often helps - a fan usually works great     8) And finally  - no electronics one hour before bed, and definitely not any in the night.  The light from them stimulates the awake center if the brain.       No change in meds - appt can be in 3 months   "

## 2023-12-25 DIAGNOSIS — E11.8 CONTROLLED TYPE 2 DIABETES MELLITUS WITH COMPLICATION, WITHOUT LONG-TERM CURRENT USE OF INSULIN (MULTI): Primary | ICD-10-CM

## 2023-12-26 RX ORDER — METFORMIN HYDROCHLORIDE 500 MG/1
TABLET ORAL
Qty: 360 TABLET | Refills: 3 | Status: SHIPPED | OUTPATIENT
Start: 2023-12-26

## 2023-12-27 DIAGNOSIS — G62.89 OTHER POLYNEUROPATHY: ICD-10-CM

## 2023-12-28 RX ORDER — PREGABALIN 75 MG/1
CAPSULE ORAL
Qty: 270 CAPSULE | Refills: 1 | Status: SHIPPED | OUTPATIENT
Start: 2023-12-28

## 2023-12-28 NOTE — TELEPHONE ENCOUNTER
I have personally reviewed the OARRS report for this person. I find it to be appropriate.   I have considered the risk of abuse, dependence, addiction and diversion.  I believe that it is clinically appropriate for this person to be prescribed this medication for the documented diagnoses.   OARRS report was reviewed on any day a prescription is written for a controlled substance.

## 2024-01-17 DIAGNOSIS — E78.00 HYPERCHOLESTEREMIA: ICD-10-CM

## 2024-01-17 DIAGNOSIS — M15.3 OTHER SECONDARY OSTEOARTHRITIS OF MULTIPLE SITES: Primary | ICD-10-CM

## 2024-01-17 RX ORDER — ATORVASTATIN CALCIUM 80 MG/1
80 TABLET, FILM COATED ORAL NIGHTLY
Qty: 90 TABLET | Refills: 3 | Status: SHIPPED | OUTPATIENT
Start: 2024-01-17

## 2024-01-17 RX ORDER — NABUMETONE 750 MG/1
750 TABLET, FILM COATED ORAL EVERY 12 HOURS
Qty: 180 TABLET | Refills: 3 | Status: SHIPPED | OUTPATIENT
Start: 2024-01-17

## 2024-02-26 DIAGNOSIS — I48.91 ATRIAL FIBRILLATION, UNSPECIFIED TYPE (MULTI): ICD-10-CM

## 2024-02-26 NOTE — TELEPHONE ENCOUNTER
My prescriptions were transferred from [facility] (when they closed) to [facility] BUT they do not take my insurance so I do not know what I am going to do about my eliquis.  Do you have any samples until I figure out what I am going to do?

## 2024-03-01 DIAGNOSIS — N40.0 BENIGN PROSTATIC HYPERPLASIA WITHOUT LOWER URINARY TRACT SYMPTOMS: ICD-10-CM

## 2024-03-01 DIAGNOSIS — I48.91 ATRIAL FIBRILLATION, UNSPECIFIED TYPE (MULTI): ICD-10-CM

## 2024-03-01 RX ORDER — TAMSULOSIN HYDROCHLORIDE 0.4 MG/1
CAPSULE ORAL
Qty: 90 CAPSULE | Refills: 3 | Status: SHIPPED | OUTPATIENT
Start: 2024-03-01

## 2024-03-01 RX ORDER — METOPROLOL TARTRATE 25 MG/1
25 TABLET, FILM COATED ORAL 2 TIMES DAILY
Qty: 180 TABLET | Refills: 3 | Status: SHIPPED | OUTPATIENT
Start: 2024-03-01 | End: 2024-03-26 | Stop reason: SDUPTHER

## 2024-03-07 DIAGNOSIS — M15.9 PRIMARY OSTEOARTHRITIS INVOLVING MULTIPLE JOINTS: ICD-10-CM

## 2024-03-08 RX ORDER — TRAMADOL HYDROCHLORIDE 50 MG/1
TABLET ORAL
Qty: 360 TABLET | Refills: 0 | Status: SHIPPED | OUTPATIENT
Start: 2024-03-08

## 2024-03-26 ENCOUNTER — OFFICE VISIT (OUTPATIENT)
Dept: PRIMARY CARE | Facility: CLINIC | Age: 64
End: 2024-03-26
Payer: MEDICARE

## 2024-03-26 VITALS
HEART RATE: 70 BPM | DIASTOLIC BLOOD PRESSURE: 80 MMHG | SYSTOLIC BLOOD PRESSURE: 128 MMHG | WEIGHT: 200 LBS | OXYGEN SATURATION: 98 % | BODY MASS INDEX: 29.53 KG/M2

## 2024-03-26 DIAGNOSIS — I10 BENIGN ESSENTIAL HYPERTENSION: ICD-10-CM

## 2024-03-26 DIAGNOSIS — I95.1 ORTHOSTATIC HYPOTENSION: ICD-10-CM

## 2024-03-26 DIAGNOSIS — F32.A CHRONIC DEPRESSION: ICD-10-CM

## 2024-03-26 DIAGNOSIS — I48.0 PAROXYSMAL A-FIB (MULTI): ICD-10-CM

## 2024-03-26 DIAGNOSIS — M15.3 OTHER SECONDARY OSTEOARTHRITIS OF MULTIPLE SITES: ICD-10-CM

## 2024-03-26 DIAGNOSIS — E11.8 CONTROLLED TYPE 2 DIABETES MELLITUS WITH COMPLICATION, WITHOUT LONG-TERM CURRENT USE OF INSULIN (MULTI): Primary | ICD-10-CM

## 2024-03-26 LAB — POC HEMOGLOBIN A1C: 6.7 % (ref 4.2–6.5)

## 2024-03-26 PROCEDURE — 3079F DIAST BP 80-89 MM HG: CPT | Performed by: FAMILY MEDICINE

## 2024-03-26 PROCEDURE — 90677 PCV20 VACCINE IM: CPT | Performed by: FAMILY MEDICINE

## 2024-03-26 PROCEDURE — 83036 HEMOGLOBIN GLYCOSYLATED A1C: CPT | Performed by: FAMILY MEDICINE

## 2024-03-26 PROCEDURE — G0009 ADMIN PNEUMOCOCCAL VACCINE: HCPCS | Performed by: FAMILY MEDICINE

## 2024-03-26 PROCEDURE — 1160F RVW MEDS BY RX/DR IN RCRD: CPT | Performed by: FAMILY MEDICINE

## 2024-03-26 PROCEDURE — 99214 OFFICE O/P EST MOD 30 MIN: CPT | Performed by: FAMILY MEDICINE

## 2024-03-26 PROCEDURE — 1036F TOBACCO NON-USER: CPT | Performed by: FAMILY MEDICINE

## 2024-03-26 PROCEDURE — 3074F SYST BP LT 130 MM HG: CPT | Performed by: FAMILY MEDICINE

## 2024-03-26 PROCEDURE — 1159F MED LIST DOCD IN RCRD: CPT | Performed by: FAMILY MEDICINE

## 2024-03-26 RX ORDER — VORTIOXETINE 20 MG/1
20 TABLET, FILM COATED ORAL DAILY
Qty: 90 TABLET | Refills: 3 | Status: SHIPPED | OUTPATIENT
Start: 2024-03-26

## 2024-03-26 RX ORDER — METOPROLOL TARTRATE 25 MG/1
12.5 TABLET, FILM COATED ORAL 2 TIMES DAILY
Qty: 90 TABLET | Refills: 3
Start: 2024-03-26

## 2024-03-26 ASSESSMENT — ENCOUNTER SYMPTOMS
DEPRESSION: 0
OCCASIONAL FEELINGS OF UNSTEADINESS: 0
LOSS OF SENSATION IN FEET: 0
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
## 2024-03-26 NOTE — PATIENT INSTRUCTIONS
Next appt in 3 months - will make it your Welcome to Medicare appt.       Take 1/2 of your metoprolol -   12.5 mg twice a day       Prevnar 20 today  -  pneumonia shot       Call to set up appt with Cardiology       Call Trintellix and Eliquis companies about helping to get coverage for them         You can use Debrox for ears.  This is an over the counter product found in most drug stores.     You can apply a few drops to the affected ear daily for a week.  IT MAY FEEL WORSE BEFORE IT FEELS BETTER as the liquid can plug up the ear canal more at first.    Do not push Q-tips into the ear canal,  only use them to sweep and pull wax out.       You have been given  prescription(s) for a controlled medication.   This/these medications  can be dangerous if not taken exactly as directed.    You have filled out a controlled medication contract, and it is very important to abide by the contract in order for me to continue to prescribe these medications for you.  You must take these medications as directed, you cannot let anyone else take any of these medications.  You have to keep them in a safe place to protect other people or animals from getting into them.     I cannot refill them if you request a refill earlier than expected.   You cannot get refills for these medications on weekends or after office hours.   You must make sure you have a visit with me every 3 months in order for me to continue to prescribe this/these medications.   Its important you are sure to ask me if you have any questions about our policies on these medications or the medications themselves.    Please bring your bottles of any remaining medications with you to any appointment where you will be needing refills of these medications.     You can be called at any time to come in for a drug screen and/or a pill count if we feel it is necessary.   If we do call you for this, you would need to come into the office within 24 hours of our call.  "        Diabetic  Patient Reminders:    Please check your blood sugars  - on average - three times a week in the morning, and three times a week 2 hours after a meal.  That gives me a good idea of the range of your sugars.  If you do this regularly, it will help you learn how your body responds to your nutrition and exercise.  If you do not want to do this regularly, bringing sugars from the latest 2 weeks before your appointment will help.      PLEASE KEEP A LOGBOOK OF THESE SUGARS.    It is very important for you to have a regular exercise routine - at least 30 minutes 5 days a week.    It is important to inspect your feet regularly, as diabetics often get numbness of their feet and then cannot feel wounds.    It is generally recommended for diabetics to take one baby aspirin daily. Be sure to clarify this with me if I have not told you to do this.    It is very important for you to get a yearly flu shot and a PREVNAR 20 vaccine to help prevent pneumonia.    Please consider getting vaccinated against COVID-19 as well.        Watching your nutrition is VERY important  - always! You want to be eating a diet low in sodium (9795-5009 mg a day); low in starches and sweets; plenty of fresh fruits and veggies (the fresher the better); whole grains; lean meats and dairy that are low in saturated and trans fats.    Watch the amounts you eat as well - pay attention to serving size.    It is important for diabetics to see their eye doctor as least once year, and their dentist every 6 months.           For General Healthy Nutrition    (Remember - NOT A DIET!   Diets are only good for class reunions.)    These are my general good nutrition recommendations for most people.   I use the term \" diet \"  in these instructions to mean your overall nutrition - how you eat and drink.   If we talked about something different during your visit with me,  other than what is written below,  follow that advice instead.       For most people, "  eating healthier means getting less added sugar and less processed foods in your diet    The fresher the better.    Added sugar is now a part of the nutrition label on manufactured food, so you can keep an eye on it easier.    But basically,  foods and beverages  that contain regular sugar and corn syrup are the main sources of added sugars.  Eating as little of these foods as you can is best.   One shocking example of the epidemic of added sugar is soda.    One can of regular soda contains about as much added sugar as 3 regular size doughnuts!     The other issue with processed foods is the amount of processed grains they contain , such as white flour.    This is also something you want to try to limit in your diet.     But, grain products are very important for your nutrition.    Whole grains are better for your body.     Cutting back on white breads, traditional pasta, baked goods, white rice,  and processed cereals will be healthier for you.   The better choices include whole grain breads,  whole wheat pasta,  brown rice, quinoa, barley, steel cut  or rolled oats.   If you eat cereal for breakfast, try to look for one made with whole grains and less sugar.   There are many people who have a problem with gluten, for a large variety of reasons.    Generally,  products made with wheat flour , barley or rye are the primary source of gluten.       Cutting back on saturated fats is important.    You want the majority of the meat that you eat to be chicken, fish or turkey.   Baked or broiled is best -  fried adds too much fat.    There are healthy fats that are important - fat is important for holding down appetite, vitamin absorption and several metabolic processes in the body.  Monounsaturated fats raise HDL (good cholesterol) and lower LDL (bad cholesterol).   Olive oil, peanut oil, nuts, seeds, and avocados are great sources of the good fats.       Ideas are:   Trade sour cream dip for hummus (which is rich in  "olive oil) or guacamole; use veggies or whole-wheat chips to dip.    Nuts are an excellent source of protein and healthy fats.   Tree nuts are the best kind, such as almonds or walnuts.   Just be careful - they are high in calories, so stick to a serving size.  (Most are about 200 calories for a 1/4 cup)      Proteins are very important for your body, and they also hold down your appetite.   Try to have protein with every meal.    These generally are meats, nuts, many beans, legumes, eggs, and dairy.   You will find protein in whole grain products and some green vegetables have a little too.     When you have dairy (if you can - many people are lactose intolerant) try to make it low fat.    Ideas are 1% milk, lowfat yogurt or cheeses, low fat cottage cheese.   I don't generally recommend FAT FREE because they often contain artificial products to improve taste, and the fat helps hold down your appetite.   If you are lactose intolerant, try to see if taking Lactaid before having dairy helps.      Fresh fruits and vegetables are VERY important.  The brighter the better.   Many vegetables are considered \"Free Foods\" - meaning you can eat as much as you want, and it does not matter.  These include tomatoes, cucumbers, celery, peppers, all the various lettuces and kale - to name a few.   Potatoes, corn and peas are starchy, so do have more calories, but are still healthy - you just want to watch the amount of them you eat.       Fruits are full of wonderful nutrition.   They contain natural sugar called fructose, so eating them in moderation is best.   Diabetics may need to pay careful attention to how their body reacts to the sugar.  Some fruits might drastically increase their blood sugar.      Eating smaller meals with a couple of small snacks is better for your metabolism than not eating for long amounts of time  (breakfast is very important).   Trying to avoid large meals is helpful too.    Eating like this helps " keep your appetite down and keeps you in burning metabolism rather than storage metabolism so your body will use the calories you eat.       I do not tell people to stop eating sweets or snack foods - just limit the amounts you have.  The less the better.   Pay attention to serving sizes, and treat them as a treat.        Foods like doughnuts, pop tarts, sugar cereals, cookies  ARE NOT GOOD FOR BREAKFAST.   They are loaded with sugar and will cause you to be hungrier in the day and often not feel well.    Caffeine needs to be limited - no more than 2 servings a day.  Some people can't have any at all.    (if you have any sleep or anxiety issues - stop the caffeine)   Coffee, many teas, many sodas, energy drinks, almost any diet supplement,  and chocolate all contain caffeine.      Water is important.   For most people, 8   x  8 ounces  a day are needed.  This may vary for some health issues.    If you need to be on a low sodium diet, that means looking at labels and eating only 1000 - 2000 mg of sodium a day.    Calcium intake is important.  3 servings of a high calcium food or drink a day is recommended.   This is usually a cup of milk, a cup of yogurt, an ounce of a hard cheese or 1.5 ounces of a soft cheese are the usual servings.   There are other high calcium foods - including soy or almond milk, broccoli,  almonds, dark green leafy vegetable.   Make sure you are not getting more than 1000  - 1200 mg of total calcium a day (unless you have been told you need more by a doctor).    Vitamin D 3 is important to absorb the calcium and for your immune system.   For children, 400 IU a day is recommended.   For adults - 800 - 5000 IU a day  is recommended.  (Often the amount needed is individualized for adults - be sure to ask how much is right for you)    Physical activity is very important for good health.    Finding activities that give you regular exercise is very important for good health.  Try to find exercise  you enjoy doing on a regular basis.    30 minutes at least 5 days a week of a good cardiovascular exercise is recommended.   That means something that gets your heart rate going faster than your usual baseline and you can find yourself breathing harder than usual while you are exercising.  If you have not done any exercise in a long time,  make sure you ask if its safe for you to start,  and be sure to gradually work up to your goal.      If you need to lose weight,  following these recommendations will help you.   And if you are doing all of this and still not losing weight, then its likely just the amount of food you are eating.   Learn to cut back on portion sizes.  Using smaller plates may help.  Healthy weight loss is  only about a pound a week.   You have to remember that whatever you do to lose the weight, you must be prepared to keep it up for life for the weight to stay off.     A lot of people have a lot of luck with using something like a fit bit,  or a program where you keep track of all of your calories that you eat and what you burn off in the day.

## 2024-04-29 ENCOUNTER — OFFICE VISIT (OUTPATIENT)
Dept: CARDIOLOGY | Facility: HOSPITAL | Age: 64
End: 2024-04-29
Payer: MEDICARE

## 2024-04-29 VITALS
WEIGHT: 202.82 LBS | SYSTOLIC BLOOD PRESSURE: 172 MMHG | DIASTOLIC BLOOD PRESSURE: 94 MMHG | OXYGEN SATURATION: 97 % | BODY MASS INDEX: 29.95 KG/M2 | HEART RATE: 62 BPM

## 2024-04-29 DIAGNOSIS — R00.2 PALPITATIONS: ICD-10-CM

## 2024-04-29 DIAGNOSIS — I10 BENIGN ESSENTIAL HYPERTENSION: ICD-10-CM

## 2024-04-29 DIAGNOSIS — I48.3 TYPICAL ATRIAL FLUTTER (MULTI): Primary | ICD-10-CM

## 2024-04-29 PROCEDURE — 93010 ELECTROCARDIOGRAM REPORT: CPT | Performed by: STUDENT IN AN ORGANIZED HEALTH CARE EDUCATION/TRAINING PROGRAM

## 2024-04-29 PROCEDURE — 99204 OFFICE O/P NEW MOD 45 MIN: CPT | Performed by: STUDENT IN AN ORGANIZED HEALTH CARE EDUCATION/TRAINING PROGRAM

## 2024-04-29 PROCEDURE — 3080F DIAST BP >= 90 MM HG: CPT | Performed by: STUDENT IN AN ORGANIZED HEALTH CARE EDUCATION/TRAINING PROGRAM

## 2024-04-29 PROCEDURE — 3077F SYST BP >= 140 MM HG: CPT | Performed by: STUDENT IN AN ORGANIZED HEALTH CARE EDUCATION/TRAINING PROGRAM

## 2024-04-29 PROCEDURE — 99214 OFFICE O/P EST MOD 30 MIN: CPT | Performed by: STUDENT IN AN ORGANIZED HEALTH CARE EDUCATION/TRAINING PROGRAM

## 2024-04-29 PROCEDURE — 1160F RVW MEDS BY RX/DR IN RCRD: CPT | Performed by: STUDENT IN AN ORGANIZED HEALTH CARE EDUCATION/TRAINING PROGRAM

## 2024-04-29 PROCEDURE — 1159F MED LIST DOCD IN RCRD: CPT | Performed by: STUDENT IN AN ORGANIZED HEALTH CARE EDUCATION/TRAINING PROGRAM

## 2024-04-29 PROCEDURE — 93005 ELECTROCARDIOGRAM TRACING: CPT | Performed by: STUDENT IN AN ORGANIZED HEALTH CARE EDUCATION/TRAINING PROGRAM

## 2024-04-29 NOTE — PROGRESS NOTES
Referred by Dr. Kaminski for No chief complaint on file.     History Of Present Illness:    Morris Aquino is a 64 y.o. male PMH of Charcot-My-Tooth disease, DM, BRAXTON on CPAP, referred to EP due to atrial flutter.  Patient had an episode of typical atrial flutter on 2023. He is on Eliquis and metoprolol.       Past Medical History:  He has a past medical history of Bell's palsy, Other conditions influencing health status (03/10/2022), Personal history of other (healed) physical injury and trauma (2017), Personal history of other diseases of male genital organs (2013), Retention of urine, unspecified (2013), and Type 2 diabetes mellitus without complications (Multi) (2022).    Past Surgical History:  He has a past surgical history that includes Other surgical history (09/10/2015); Cervical laminectomy (2013); and Other surgical history (2013).      Social History:  He reports that he has never smoked. He has never used smokeless tobacco. No history on file for alcohol use and drug use.    Family History:  Family History   Problem Relation Name Age of Onset    Charcot-My-Tooth disease Mother      Breast cancer Mother           at age 37 of breast cancer    Other (Cardiac disorder) Father      Hypertension Father      Diabetes Father          Type II    Colon cancer Maternal Grandfather      Charcot-My-Tooth disease Sibling      Asthma Other      Diabetes Other      Hypertension Other          Allergies:  Patient has no known allergies.    Outpatient Medications:  Current Outpatient Medications   Medication Instructions    apixaban (ELIQUIS) 5 mg, oral, 2 times daily    aspirin 81 mg EC tablet 1 tablet, oral, Daily    atorvastatin (LIPITOR) 80 mg, oral, Nightly    blood sugar diagnostic (Blood Glucose Test) strip In Vitro Strips. TEST ONCE DAILY AS DIRECTED DX E11.9    CAFFEINE ORAL Caffeine TABS   Refills: 0       Active    cetirizine (ZyrTEC) 10 mg tablet 1  tablet, oral, Daily    clobetasol (Temovate) 0.05 % cream APPLY SPARINGLY TO AFFECTED AREA(S) TWICE DAILY    ergocalciferol (VITAMIN D-2) 1,250 mcg, oral, Once Weekly    FreeStyle Lite Strips strip TEST ONCE DAILY    LANCETS MISC 28G. Test once daily - DX E 11.9    levothyroxine (SYNTHROID, LEVOXYL) 100 mcg, oral, Daily    metFORMIN (Glucophage) 500 mg tablet TAKE 2 TABLETS TWICE A DAY WITH MORNING AND EVENING MEALS    metoprolol tartrate (LOPRESSOR) 12.5 mg, oral, 2 times daily    nabumetone (RELAFEN) 750 mg, oral, Every 12 hours    omeprazole (PriLOSEC) 20 mg DR capsule 1 capsule, oral, Daily    pregabalin (Lyrica) 75 mg capsule TAKE 1 CAPSULE THREE TIMES A DAY    tamsulosin (Flomax) 0.4 mg 24 hr capsule TAKE 1 CAPSULE ONCE DAILY 30 MINUTES FOLLOWING THE SAME MEAL DAILY    traMADol (Ultram) 50 mg tablet TAKE 1 TABLET EVERY 6 HOURS IF NEEDED FOR SEVERE PAIN (7 TO 10)    Trintellix 20 mg, oral, Daily        Last Recorded Vitals:  Vitals:    04/29/24 1122   BP: (!) 172/94   Pulse: 62   SpO2: 97%   Weight: 92 kg (202 lb 13.2 oz)       Review of Systems   Constitutional:  Negative for fever.   Respiratory:  Negative for shortness of breath.    Cardiovascular:         As per history.   Neurological:  Negative for dizziness and syncope.   Psychiatric/Behavioral:  Negative for confusion.       Physical Exam  Constitutional:       Appearance: Normal appearance.   Cardiovascular:      Rate and Rhythm: Normal rate and regular rhythm.      Heart sounds: No murmur heard.     No friction rub. No gallop.   Pulmonary:      Effort: Pulmonary effort is normal.      Breath sounds: Normal breath sounds.   Abdominal:      Palpations: Abdomen is soft.   Musculoskeletal:      Cervical back: Neck supple.   Neurological:      Mental Status: He is alert.   Psychiatric:         Mood and Affect: Mood normal.         Behavior: Behavior normal.             Last Labs:  CBC -  Lab Results   Component Value Date    WBC 8.1 12/07/2023    HGB 14.3  12/07/2023    HCT 43.2 12/07/2023    MCV 88 12/07/2023     12/07/2023       CMP -  Lab Results   Component Value Date    CALCIUM 9.1 12/07/2023    PROT 6.4 06/29/2023    ALBUMIN 4.0 06/29/2023    AST 22 06/29/2023    ALT 34 06/29/2023    ALKPHOS 74 06/29/2023    BILITOT 0.8 06/29/2023       LIPID PANEL -   Lab Results   Component Value Date    CHOL 120 06/29/2023    TRIG 95 06/29/2023    HDL 33.1 (A) 06/29/2023    CHHDL 3.6 06/29/2023    LDLF 68 06/29/2023    VLDL 19 06/29/2023       RENAL FUNCTION PANEL -   Lab Results   Component Value Date    GLUCOSE 144 (H) 12/07/2023     12/07/2023    K 4.1 12/07/2023     12/07/2023    CO2 27 12/07/2023    ANIONGAP 13 12/07/2023    BUN 20 12/07/2023    CREATININE 0.92 12/07/2023    GFRMALE >90 06/29/2023    CALCIUM 9.1 12/07/2023    ALBUMIN 4.0 06/29/2023        Lab Results   Component Value Date    HGBA1C 6.7 (A) 03/26/2024       Last Cardiology Tests:  ECG:  ECG 12 lead (Clinic Performed) 04/29/2024    Sinus rhythm, 1st degree AV block, 63 bpm.    Diagnostic review: I have personally reviewed the result(s)     Assessment/Plan   Diagnoses and all orders for this visit:  Typical atrial flutter (Multi)  -     Holter or Event Cardiac Monitor; Future  -     Transthoracic echo (TTE) complete; Future  Benign essential hypertension  -     ECG 12 lead (Clinic Performed)  Palpitations  -     Referral to Cardiology  Other orders  -     perflutren lipid microspheres (Definity) injection 0.5-10 mL of dilution  -     sulfur hexafluoride microsphr (Lumason) injection 24.28 mg  -     perflutren protein A microsphere (Optison) injection 0.5 mL    Patient had an episode of typical atrial flutter on October 2 2023. He is on Eliquis and metoprolol. Will order event monitor and echocardiogram. If no afib, will consider CTI ablation. If no recurrence after that, we may consider OAC discontinuation.      Supriya Mendosa MD

## 2024-04-30 LAB
ATRIAL RATE: 63 BPM
P AXIS: 33 DEGREES
P OFFSET: 175 MS
P ONSET: 109 MS
PR INTERVAL: 226 MS
Q ONSET: 222 MS
QRS COUNT: 11 BEATS
QRS DURATION: 92 MS
QT INTERVAL: 390 MS
QTC CALCULATION(BAZETT): 399 MS
QTC FREDERICIA: 396 MS
R AXIS: 8 DEGREES
T AXIS: 19 DEGREES
T OFFSET: 417 MS
VENTRICULAR RATE: 63 BPM

## 2024-04-30 ASSESSMENT — ENCOUNTER SYMPTOMS
SHORTNESS OF BREATH: 0
CONFUSION: 0
DIZZINESS: 0
FEVER: 0

## 2024-05-21 ENCOUNTER — HOSPITAL ENCOUNTER (OUTPATIENT)
Dept: CARDIOLOGY | Facility: HOSPITAL | Age: 64
Discharge: HOME | End: 2024-05-21
Payer: MEDICARE

## 2024-05-21 DIAGNOSIS — I48.3 TYPICAL ATRIAL FLUTTER (MULTI): ICD-10-CM

## 2024-05-21 LAB
AORTIC VALVE MEAN GRADIENT: 3.9 MMHG
AORTIC VALVE PEAK VELOCITY: 1.29 M/S
AV PEAK GRADIENT: 6.6 MMHG
AVA (PEAK VEL): 3.22 CM2
AVA (VTI): 3.17 CM2
EJECTION FRACTION APICAL 4 CHAMBER: 51.1
LEFT ATRIUM VOLUME AREA LENGTH INDEX BSA: 17.2 ML/M2
LEFT VENTRICLE INTERNAL DIMENSION DIASTOLE: 4.73 CM (ref 3.5–6)
LEFT VENTRICULAR OUTFLOW TRACT DIAMETER: 1.98 CM
LV EJECTION FRACTION BIPLANE: 51 %
MITRAL VALVE E/A RATIO: 1.01
MITRAL VALVE E/E' RATIO: 8.95
RIGHT VENTRICLE FREE WALL PEAK S': 14 CM/S
RIGHT VENTRICLE PEAK SYSTOLIC PRESSURE: 28.5 MMHG
TRICUSPID ANNULAR PLANE SYSTOLIC EXCURSION: 2 CM

## 2024-05-21 PROCEDURE — 93306 TTE W/DOPPLER COMPLETE: CPT

## 2024-05-21 PROCEDURE — 93246 EXT ECG>7D<15D RECORDING: CPT

## 2024-05-21 PROCEDURE — 93306 TTE W/DOPPLER COMPLETE: CPT | Performed by: INTERNAL MEDICINE

## 2024-06-14 DIAGNOSIS — M15.9 PRIMARY OSTEOARTHRITIS INVOLVING MULTIPLE JOINTS: ICD-10-CM

## 2024-06-14 DIAGNOSIS — G62.89 OTHER POLYNEUROPATHY: ICD-10-CM

## 2024-06-14 RX ORDER — PREGABALIN 75 MG/1
CAPSULE ORAL
Qty: 270 CAPSULE | Refills: 1 | Status: SHIPPED | OUTPATIENT
Start: 2024-06-14

## 2024-06-14 RX ORDER — TRAMADOL HYDROCHLORIDE 50 MG/1
50 TABLET ORAL EVERY 6 HOURS PRN
Qty: 360 TABLET | Refills: 0 | Status: SHIPPED | OUTPATIENT
Start: 2024-06-14

## 2024-06-24 ENCOUNTER — OFFICE VISIT (OUTPATIENT)
Dept: CARDIOLOGY | Facility: HOSPITAL | Age: 64
End: 2024-06-24
Payer: MEDICARE

## 2024-06-24 VITALS
HEART RATE: 75 BPM | OXYGEN SATURATION: 96 % | WEIGHT: 198.85 LBS | BODY MASS INDEX: 29.37 KG/M2 | DIASTOLIC BLOOD PRESSURE: 78 MMHG | SYSTOLIC BLOOD PRESSURE: 125 MMHG

## 2024-06-24 DIAGNOSIS — I10 BENIGN ESSENTIAL HYPERTENSION: ICD-10-CM

## 2024-06-24 PROCEDURE — 93005 ELECTROCARDIOGRAM TRACING: CPT | Performed by: STUDENT IN AN ORGANIZED HEALTH CARE EDUCATION/TRAINING PROGRAM

## 2024-06-24 PROCEDURE — 3074F SYST BP LT 130 MM HG: CPT | Performed by: STUDENT IN AN ORGANIZED HEALTH CARE EDUCATION/TRAINING PROGRAM

## 2024-06-24 PROCEDURE — 99214 OFFICE O/P EST MOD 30 MIN: CPT | Performed by: STUDENT IN AN ORGANIZED HEALTH CARE EDUCATION/TRAINING PROGRAM

## 2024-06-24 PROCEDURE — 1036F TOBACCO NON-USER: CPT | Performed by: STUDENT IN AN ORGANIZED HEALTH CARE EDUCATION/TRAINING PROGRAM

## 2024-06-24 PROCEDURE — 1159F MED LIST DOCD IN RCRD: CPT | Performed by: STUDENT IN AN ORGANIZED HEALTH CARE EDUCATION/TRAINING PROGRAM

## 2024-06-24 PROCEDURE — 3078F DIAST BP <80 MM HG: CPT | Performed by: STUDENT IN AN ORGANIZED HEALTH CARE EDUCATION/TRAINING PROGRAM

## 2024-06-24 ASSESSMENT — ENCOUNTER SYMPTOMS
DIZZINESS: 0
CONFUSION: 0
FEVER: 0
SHORTNESS OF BREATH: 0

## 2024-06-24 NOTE — PROGRESS NOTES
Referred by Dr. Kaminski for No chief complaint on file.     History Of Present Illness:    Morris Aquino is a 64 y.o. male PMH of Charcot-My-Tooth disease, DM, BRAXTON on CPAP, referred to EP due to atrial flutter.  Patient had an episode of typical atrial flutter on 2023. He is on Eliquis and metoprolol.   Patient comes in today for follow up. He is feeling well. He had a normal echo in May 2024, with normal RA and LA size. Event monitor showed no episodes of afib or flutter. ECG today shows sinus rhythm with HR 68 bpm.      Past Medical History:  He has a past medical history of Bell's palsy, Other conditions influencing health status (03/10/2022), Personal history of other (healed) physical injury and trauma (2017), Personal history of other diseases of male genital organs (2013), Retention of urine, unspecified (2013), and Type 2 diabetes mellitus without complications (Multi) (2022).    Past Surgical History:  He has a past surgical history that includes Other surgical history (09/10/2015); Cervical laminectomy (2013); and Other surgical history (2013).      Social History:  He reports that he has never smoked. He has never used smokeless tobacco. No history on file for alcohol use and drug use.    Family History:  Family History   Problem Relation Name Age of Onset    Charcot-My-Tooth disease Mother      Breast cancer Mother           at age 37 of breast cancer    Other (Cardiac disorder) Father      Hypertension Father      Diabetes Father          Type II    Colon cancer Maternal Grandfather      Charcot-My-Tooth disease Sibling      Asthma Other      Diabetes Other      Hypertension Other          Allergies:  Patient has no known allergies.    Outpatient Medications:  Current Outpatient Medications   Medication Instructions    apixaban (ELIQUIS) 5 mg, oral, 2 times daily    aspirin 81 mg EC tablet 1 tablet, oral, Daily    atorvastatin (LIPITOR) 80  mg, oral, Nightly    blood sugar diagnostic (Blood Glucose Test) strip In Vitro Strips. TEST ONCE DAILY AS DIRECTED DX E11.9    CAFFEINE ORAL Caffeine TABS   Refills: 0       Active    cetirizine (ZyrTEC) 10 mg tablet 1 tablet, oral, Daily    clobetasol (Temovate) 0.05 % cream APPLY SPARINGLY TO AFFECTED AREA(S) TWICE DAILY    ergocalciferol (VITAMIN D-2) 1,250 mcg, oral, Once Weekly    FreeStyle Lite Strips strip TEST ONCE DAILY    LANCETS MISC 28G. Test once daily - DX E 11.9    levothyroxine (SYNTHROID, LEVOXYL) 100 mcg, oral, Daily    metFORMIN (Glucophage) 500 mg tablet TAKE 2 TABLETS TWICE A DAY WITH MORNING AND EVENING MEALS    metoprolol tartrate (LOPRESSOR) 12.5 mg, oral, 2 times daily    nabumetone (RELAFEN) 750 mg, oral, Every 12 hours    omeprazole (PriLOSEC) 20 mg DR capsule 1 capsule, oral, Daily    pregabalin (Lyrica) 75 mg capsule TAKE 1 CAPSULE THREE TIMES A DAY    tamsulosin (Flomax) 0.4 mg 24 hr capsule TAKE 1 CAPSULE ONCE DAILY 30 MINUTES FOLLOWING THE SAME MEAL DAILY    traMADol (ULTRAM) 50 mg, oral, Every 6 hours PRN    Trintellix 20 mg, oral, Daily        Last Recorded Vitals:  Vitals:    06/24/24 1312   BP: 125/78   Pulse: 75   SpO2: 96%   Weight: 90.2 kg (198 lb 13.7 oz)       Review of Systems   Constitutional:  Negative for fever.   Respiratory:  Negative for shortness of breath.    Cardiovascular:         As per history.   Neurological:  Negative for dizziness and syncope.   Psychiatric/Behavioral:  Negative for confusion.       Physical Exam  Constitutional:       Appearance: Normal appearance.   Cardiovascular:      Rate and Rhythm: Normal rate and regular rhythm.      Heart sounds: No murmur heard.     No friction rub. No gallop.   Pulmonary:      Effort: Pulmonary effort is normal.      Breath sounds: Normal breath sounds.   Abdominal:      Palpations: Abdomen is soft.   Musculoskeletal:      Cervical back: Neck supple.   Neurological:      Mental Status: He is alert.   Psychiatric:          Mood and Affect: Mood normal.         Behavior: Behavior normal.           Last Labs:  CBC -  Lab Results   Component Value Date    WBC 8.1 12/07/2023    HGB 14.3 12/07/2023    HCT 43.2 12/07/2023    MCV 88 12/07/2023     12/07/2023       CMP -  Lab Results   Component Value Date    CALCIUM 9.1 12/07/2023    PROT 6.4 06/29/2023    ALBUMIN 4.0 06/29/2023    AST 22 06/29/2023    ALT 34 06/29/2023    ALKPHOS 74 06/29/2023    BILITOT 0.8 06/29/2023       LIPID PANEL -   Lab Results   Component Value Date    CHOL 120 06/29/2023    TRIG 95 06/29/2023    HDL 33.1 (A) 06/29/2023    CHHDL 3.6 06/29/2023    LDLF 68 06/29/2023    VLDL 19 06/29/2023       RENAL FUNCTION PANEL -   Lab Results   Component Value Date    GLUCOSE 144 (H) 12/07/2023     12/07/2023    K 4.1 12/07/2023     12/07/2023    CO2 27 12/07/2023    ANIONGAP 13 12/07/2023    BUN 20 12/07/2023    CREATININE 0.92 12/07/2023    GFRMALE >90 06/29/2023    CALCIUM 9.1 12/07/2023    ALBUMIN 4.0 06/29/2023        Lab Results   Component Value Date    HGBA1C 6.7 (A) 03/26/2024       Last Cardiology Tests:  ECG:    ECG 12 lead (Clinic Performed) 06/24/2024    Sinus rhythm, 1st degree AV block, 68 bpm.    ECG 12 lead (Clinic Performed) 04/29/2024    Sinus rhythm, 1st degree AV block, 63 bpm.    Echocardiogram 5/21/2024      PHYSICIAN INTERPRETATION:  Left Ventricle: The left ventricular systolic function is normal, with an estimated ejection fraction of 55-60%. There are no regional wall motion abnormalities. The left ventricular cavity size is normal. Spectral Doppler shows a normal pattern of left ventricular diastolic filling.  Left Atrium: The left atrium is normal in size.  Right Ventricle: The right ventricle is normal in size. There is normal right ventricular global systolic function.  Right Atrium: The right atrium is normal in size.  Aortic Valve: The aortic valve appears structurally normal. There is no evidence of aortic valve  regurgitation. The peak instantaneous gradient of the aortic valve is 6.6 mmHg. The mean gradient of the aortic valve is 3.9 mmHg.  Mitral Valve: The mitral valve is normal in structure. There is trace to mild mitral valve regurgitation.  Tricuspid Valve: The tricuspid valve is structurally normal. There is trace to mild tricuspid regurgitation.  Pulmonic Valve: The pulmonic valve is structurally normal. There is no indication of pulmonic valve regurgitation.  Pericardium: There is no pericardial effusion noted.  Aorta: The aortic root is normal.  Pulmonary Artery: The tricuspid regurgitant velocity is 2.15 m/s, and with an estimated right atrial pressure of 10 mmHg, the estimated pulmonary artery pressure is normal with the RVSP at 28.5 mmHg.  Systemic Veins: The inferior vena cava was not well visualized.        CONCLUSIONS:   1. Left ventricular systolic function is normal with a 55-60% estimated ejection fraction.   2. There is trace-mild mitral and tricuspid regurgitation.    Event monitor (5/21/2024)    Patient had a min HR of 44 bpm, max HR of 171 bpm, and avg HR of 69  bpm. Predominant underlying rhythm was Sinus Rhythm. First Degree AV  Block was present. 6 Supraventricular Tachycardia runs occurred, the run  with the fastest interval lasting 4 beats with a max rate of 171 bpm, the  longest lasting 8 beats with an avg rate of 123 bpm. Isolated SVEs were  rare (<1.0%), SVE Couplets were rare (<1.0%), and SVE Triplets were rare  (<1.0%). Isolated VEs were rare (<1.0%), and no VE Couplets or VE  Triplets were present.    Diagnostic review: I have personally reviewed the result(s)     Assessment/Plan   Diagnoses and all orders for this visit:  Benign essential hypertension  -     ECG 12 lead (Clinic Performed)    Patient had an episode of typical atrial flutter on October 2 2023. He is on Eliquis and metoprolol. He had a normal echo in May 2024, with normal RA and LA size. Event monitor showed no episodes of  afib or flutter. ECG today shows sinus rhythm with HR 68 bpm.  I had a long discussion with the patient about atrial flutter and treatment options, medications and catheter ablation. I explained the risks, benefits and procedure details of catheter ablation of atrial flutter. The risks include, but are not limited to bleeding, infection, and pain from the catheter insertion, damage to the blood vessels from the catheter, puncture to the heart, blood clots, which might lead to a stroke, radiation exposure and death. The patient would like to proceed with catheter ablation for atrial flutter. Will schedule. Will plan to discontinue his Eliquis a month after his ablation if no recurrence.        Supriya Mendosa MD

## 2024-06-25 LAB
ATRIAL RATE: 68 BPM
P AXIS: 29 DEGREES
P OFFSET: 178 MS
P ONSET: 123 MS
PR INTERVAL: 198 MS
Q ONSET: 222 MS
QRS COUNT: 12 BEATS
QRS DURATION: 86 MS
QT INTERVAL: 376 MS
QTC CALCULATION(BAZETT): 399 MS
QTC FREDERICIA: 392 MS
R AXIS: 7 DEGREES
T AXIS: 19 DEGREES
T OFFSET: 410 MS
VENTRICULAR RATE: 68 BPM

## 2024-06-27 ENCOUNTER — APPOINTMENT (OUTPATIENT)
Dept: PRIMARY CARE | Facility: CLINIC | Age: 64
End: 2024-06-27
Payer: MEDICARE

## 2024-06-27 VITALS
HEART RATE: 56 BPM | BODY MASS INDEX: 29.39 KG/M2 | WEIGHT: 199 LBS | SYSTOLIC BLOOD PRESSURE: 110 MMHG | DIASTOLIC BLOOD PRESSURE: 68 MMHG | OXYGEN SATURATION: 98 %

## 2024-06-27 DIAGNOSIS — I10 BENIGN ESSENTIAL HYPERTENSION: ICD-10-CM

## 2024-06-27 DIAGNOSIS — Z00.00 ROUTINE GENERAL MEDICAL EXAMINATION AT HEALTH CARE FACILITY: ICD-10-CM

## 2024-06-27 DIAGNOSIS — E11.8 CONTROLLED TYPE 2 DIABETES MELLITUS WITH COMPLICATION, WITHOUT LONG-TERM CURRENT USE OF INSULIN (MULTI): ICD-10-CM

## 2024-06-27 DIAGNOSIS — Z12.11 SCREEN FOR COLON CANCER: ICD-10-CM

## 2024-06-27 DIAGNOSIS — Z11.59 NEED FOR HEPATITIS C SCREENING TEST: ICD-10-CM

## 2024-06-27 DIAGNOSIS — H61.22 IMPACTED CERUMEN OF LEFT EAR: ICD-10-CM

## 2024-06-27 DIAGNOSIS — Z12.5 SCREENING FOR PROSTATE CANCER: ICD-10-CM

## 2024-06-27 DIAGNOSIS — Z00.00 WELCOME TO MEDICARE PREVENTIVE VISIT: Primary | ICD-10-CM

## 2024-06-27 LAB
ALBUMIN SERPL BCP-MCNC: 4.3 G/DL (ref 3.4–5)
ALP SERPL-CCNC: 82 U/L (ref 33–136)
ALT SERPL W P-5'-P-CCNC: 43 U/L (ref 10–52)
ANION GAP SERPL CALC-SCNC: 16 MMOL/L (ref 10–20)
AST SERPL W P-5'-P-CCNC: 27 U/L (ref 9–39)
BASOPHILS # BLD AUTO: 0.05 X10*3/UL (ref 0–0.1)
BASOPHILS NFR BLD AUTO: 0.4 %
BILIRUB SERPL-MCNC: 0.8 MG/DL (ref 0–1.2)
BUN SERPL-MCNC: 17 MG/DL (ref 6–23)
CALCIUM SERPL-MCNC: 9.4 MG/DL (ref 8.6–10.3)
CHLORIDE SERPL-SCNC: 100 MMOL/L (ref 98–107)
CHOLEST SERPL-MCNC: 134 MG/DL (ref 0–199)
CHOLESTEROL/HDL RATIO: 3.8
CO2 SERPL-SCNC: 28 MMOL/L (ref 21–32)
CREAT SERPL-MCNC: 1.09 MG/DL (ref 0.5–1.3)
EGFRCR SERPLBLD CKD-EPI 2021: 75 ML/MIN/1.73M*2
EOSINOPHIL # BLD AUTO: 0.26 X10*3/UL (ref 0–0.7)
EOSINOPHIL NFR BLD AUTO: 2.3 %
ERYTHROCYTE [DISTWIDTH] IN BLOOD BY AUTOMATED COUNT: 13 % (ref 11.5–14.5)
GLUCOSE SERPL-MCNC: 166 MG/DL (ref 74–99)
HCT VFR BLD AUTO: 46.9 % (ref 41–52)
HDLC SERPL-MCNC: 35.1 MG/DL
HGB BLD-MCNC: 15.5 G/DL (ref 13.5–17.5)
IMM GRANULOCYTES # BLD AUTO: 0.05 X10*3/UL (ref 0–0.7)
IMM GRANULOCYTES NFR BLD AUTO: 0.4 % (ref 0–0.9)
LDLC SERPL CALC-MCNC: 66 MG/DL
LYMPHOCYTES # BLD AUTO: 2.09 X10*3/UL (ref 1.2–4.8)
LYMPHOCYTES NFR BLD AUTO: 18.3 %
MCH RBC QN AUTO: 29.2 PG (ref 26–34)
MCHC RBC AUTO-ENTMCNC: 33 G/DL (ref 32–36)
MCV RBC AUTO: 88 FL (ref 80–100)
MONOCYTES # BLD AUTO: 0.58 X10*3/UL (ref 0.1–1)
MONOCYTES NFR BLD AUTO: 5.1 %
NEUTROPHILS # BLD AUTO: 8.41 X10*3/UL (ref 1.2–7.7)
NEUTROPHILS NFR BLD AUTO: 73.5 %
NON HDL CHOLESTEROL: 99 MG/DL (ref 0–149)
NRBC BLD-RTO: 0 /100 WBCS (ref 0–0)
PLATELET # BLD AUTO: 197 X10*3/UL (ref 150–450)
POTASSIUM SERPL-SCNC: 5.5 MMOL/L (ref 3.5–5.3)
PROT SERPL-MCNC: 6.9 G/DL (ref 6.4–8.2)
RBC # BLD AUTO: 5.31 X10*6/UL (ref 4.5–5.9)
SODIUM SERPL-SCNC: 138 MMOL/L (ref 136–145)
TRIGL SERPL-MCNC: 163 MG/DL (ref 0–149)
TSH SERPL-ACNC: 3.62 MIU/L (ref 0.44–3.98)
VLDL: 33 MG/DL (ref 0–40)
WBC # BLD AUTO: 11.4 X10*3/UL (ref 4.4–11.3)

## 2024-06-27 PROCEDURE — 36415 COLL VENOUS BLD VENIPUNCTURE: CPT

## 2024-06-27 PROCEDURE — 83036 HEMOGLOBIN GLYCOSYLATED A1C: CPT

## 2024-06-27 PROCEDURE — 84443 ASSAY THYROID STIM HORMONE: CPT

## 2024-06-27 PROCEDURE — 86803 HEPATITIS C AB TEST: CPT

## 2024-06-27 PROCEDURE — 80061 LIPID PANEL: CPT

## 2024-06-27 PROCEDURE — 85025 COMPLETE CBC W/AUTO DIFF WBC: CPT

## 2024-06-27 PROCEDURE — 80053 COMPREHEN METABOLIC PANEL: CPT

## 2024-06-27 PROCEDURE — G0103 PSA SCREENING: HCPCS

## 2024-06-27 ASSESSMENT — ACTIVITIES OF DAILY LIVING (ADL)
DOING_HOUSEWORK: INDEPENDENT
GROCERY_SHOPPING: INDEPENDENT
MANAGING_FINANCES: INDEPENDENT
BATHING: INDEPENDENT
TAKING_MEDICATION: INDEPENDENT
DRESSING: INDEPENDENT

## 2024-06-27 ASSESSMENT — PATIENT HEALTH QUESTIONNAIRE - PHQ9
SUM OF ALL RESPONSES TO PHQ9 QUESTIONS 1 AND 2: 0
2. FEELING DOWN, DEPRESSED OR HOPELESS: NOT AT ALL
1. LITTLE INTEREST OR PLEASURE IN DOING THINGS: NOT AT ALL

## 2024-06-27 ASSESSMENT — VISUAL ACUITY
OD_CC: 20/25
OS_CC: 20/20

## 2024-06-27 NOTE — PATIENT INSTRUCTIONS
[facility] at the pharmacy any time.     Flu and covid this fall.       You should hear from [facility] within a week  - if not  -   Give them a call  [phone]       Ways to Help Prevent Falls at Home    Quick Tips   ? Ask for help if you need it. Most people want to help!   ? Get up slowly after sitting or laying down   ? Wear a medical alert device or keep cell phone in your pocket   ? Use night lights, especially areas near a bathroom   ? Keep the items you use often within reach on a small stool or end table   ? Use an assistive device such as walker or cane, as directed by provider/physical therapy   ? Use a non-slip mat and grab bars in your bathroom. Look for home health sections for best options     Other Areas to Focus On   ? Exercise and nutrition: Regular exercise or taking a falls prevention class are great ways improve strength and balance. Don’t forget to stay hydrated and bring a snack!   ? Medicine side effects: Some medicines can make you sleepy or dizzy, which could cause a fall. Ask your healthcare provider about the side effects your medicines could cause. Be sure to let them know if you take any vitamins or supplements as well.   ? Tripping hazards: Remove items you could trip on, such as loose mats, rugs, cords, and clutter. Wear closed toe shoes with rubber soles.   ? Health and wellness: Get regular checkups with your healthcare provider, plus routine vision and hearing screenings. Talk with your healthcare provider about:   o Your medicines and the possible side effects - bring them in a bag if that is easier!   o Problems with balance or feeling dizzy   o Ways to promote bone health, such as Vitamin D and calcium supplements   o Questions or concerns about falling     *Ask your healthcare team if you have questions     [facility], 2022       ABOUT MEDICARE PREVENTATIVE APPOINTMENTS:     YOUR YEARLY MEDICARE WELLNESS VISIT IS VERY IMPORTANT.      Medicare wants all of their  "patients to schedule their \"Welcome to Medicare\" visit  when you are in the first 12 months of being on Medicare.    Then every year after that, they want you to schedule your  \"Annual Wellness\"  visit.     These visits have a very specific list of topics I have to cover at the visit,  so you will have paperwork you need to complete before I see you.   Of interest,  there is NOT actually a physical exam as part of this visit.       If you are female,   a Well Woman Exam  (Breast exam and pelvic exam) - are paid for by Medicare every 2 years.   Mammograms are paid for every year.    If you are due for this exam too,  the Medicare wellness and the well woman exam can be done on the same day,  PLEASE TELL THIS TO  WHEN MAKING THE APPOINTMENT.      Some of the Medicare plans ALSO cover a traditional \"physical\" - which has more of the physical exam component.   You may want to find out if your insurance covers that too.       (this is  the code - 73502)  - That can be added to the visit as well.    You would need to tell us.     IT'S VERY HELPFUL IF YOU KNOW WHAT YOUR PLAN COVERS AHEAD OF THE VISIT.      Please check to see what your plan(s) cover.   (Even checking on testing and vaccines that are covered or not helps us greatly!)     At these appointments ,  IF  we cover any of your chronic medical conditions,  medical concerns,  or medications,  I will also be billing jenn  \"E/M  code\" which stands for \"Evaluation and Management\"    -  which is a regular office visit code.    THAT CHARGE MAY BE SUBJECT TO CO-PAYS AND DEDUCTIBLES.     PLEASE DO NOT \"SAVE\" ALL OF YOUR CONCERNS TO GO OVER AT THESE YEARLY WELLNESS VISITS.   YOU SHOULD BE SCHEDULING SEPARATE APPOINTMENTS WHEN YOU HAVE HEALTH CONCERNS TO DISCUSS AND FOR YOUR MEDICATION CHECK UP APPOINTMENTS.        Thank you.            WELL VISIT/PREVENTATIVE VISIT INSTRUCTIONS:        Call [phone] to schedule any testing ordered at [facility].      For " a mammogram, call   [phone] .    Please work on healthy nutrition,  optimal sleep, and regular exercise to feel better.    If you smoke - please quit, and if you drink alcohol, please limit your intake.       Be sure to ask me about any vaccines you may be due for,  and ask me any questions you may have about vaccines.   Generally -  a flu shot is recommended every fall for everyone over 6 months of age,  a pneumonia shot is recommended for anyone 65 and over or who has chronic conditions such as diabetes, heart or lung disease,  the shingles vaccines are recommended for people age 50 and over,   a Tetnas/Pertussis booster is very 10 years (after the childhood set);  the RSV vaccine is for people age 65 and over,  and the COVID vaccines are recommended for everyone, but the boosters are often particular people, so ask me if you qualify.      There may be more vaccines you qualify for depending on your medical conditions, so be sure to ask me about that if you have any chronic illnesses.    Some people have insurance that will pay for the vaccines at the pharmacy, and some your doctors office - so be sure to check with your insurance about the best place to get your vaccines and your coverage of them.     Generally speaking,  good nutrition consists of lean meats, like chicken, fish and turkey (not fried);    plenty of fruits and vegetables (the fresher the better);   Less sugars, saturated fats, and processed foods - especially those made with white flour.   Try to eat the majority of your grain foods  as whole grains.   Keep an eye on your total calories in a day and serving sizes on packaging - this will help you limit your overall intake.    Remember, to lose weight (if you need to), your total calorie intake has to be about 300 - 500 calories less than what you burn in a day.   That number depends on your age, gender and body size.   Some people find a fitbit (calorie tracking device) helpful.      Please  "be sure to see the dentist every 6 months.    Please see the eye doctor no longer than every 2 years.    When you have your Living Will and Medical Power of  papers completed   (they have to be witnessed by 2 non-relatives or notarized to be legally binding),     please keep your originals in a safe place in your home, but make sure all your physicians have copies and that you take copies with you when you go to the hospital.        GETTING TEST RESULTS:    YOU WILL ALWAYS FIND OUT ABOUT ALL YOUR TEST RESULTS FROM ME.  I do not use the \"no news is good news\" policy.   The only time you would not, is if I have told you to get the testing done, and make a follow up appointment to go over it.    Then I will be waiting to talk to you at the visit about your test results.     I have a few different ways I get test results to you  (if its something urgent, we call you)  :       If you have a Secure[employer] card -  I will have your test results on your secureach card within a week.  You should get a phone call telling you when the results are on the card, or just call the card in a week.   If two weeks go  by and you have not heard anything, call the card to see if the results are there,  and if not,  leave me a message.  If you are having trouble using [employer], they have a support line you should call :   [phone];   If you have lost your card, I need to know.     IT'S VERY IMPORTANT THAT YOU CALL TO LISTEN TO YOUR RESULTS, AS IT THEN LETS ME KNOW YOU GOT YOUR RESULTS.        If you get your test results on MY CHART,  you may commonly see your results even before I do.      I will always annotate a message on your results so you know that I saw them and how I feel about them.     If you need some help with MY CHART call the support number at [phone].    IF I mail your results to you,   I need to hear from you if you do not receive them within 2 weeks.      Be sure to let me know your preference for " "getting results.         BILLING FOR PREVENTATIVE VISITS.     Most insurance companies pay for a yearly \"Wellness Check\"  or they may call it a \"Preventative Physical\"   - but it's important to know what your plan covers ahead of the visit.    It's also a good idea to find out what sort of preventative testing they will cover for screening tests - like cholesterol, blood sugar, or colonoscopies. Also, find out which vaccines are covered and if you have any responsibility in paying for them.       If at your Wellness Visit,  we cover anything to do with problems/issues  you are having or  chronic medications/ medical conditions you have,   there will ALSO be a regular office charge that day.     Blood work  or testing obtained that has anything to do with an acute issue or chronic condition is billed under that diagnosis and not screening.       It's a good idea to find out from your insurance what is covered and what is your responsibility for all of the above possible charges.           Most importantly,   if you ever have any questions or concerns that cannot wait until your next visit with me,  you can message me through MY CHART or call the office and leave a voice mail message  (please allow for at least 24 hours for responses for these messages).       Take good care.   Dr Marvin      I will have your test results on your Where card within a week.    If I don't, please call and leave me a message that they were not there.  I may have not seen them.       To call for your test results,  the Where phone number on the card is    [phone]  You also need your Mailbox ID number and your Pin number which are on your card.   If you need assistance using the card or if you have lost it, call the Where help number at   [phone]                 Ways to Help Prevent Falls at Home    Quick Tips   ? Ask for help if you need it. Most people want to help!   ? Get up slowly after sitting or laying down   ? " Wear a medical alert device or keep cell phone in your pocket   ? Use night lights, especially areas near a bathroom   ? Keep the items you use often within reach on a small stool or end table   ? Use an assistive device such as walker or cane, as directed by provider/physical therapy   ? Use a non-slip mat and grab bars in your bathroom. Look for home health sections for best options     Other Areas to Focus On   ? Exercise and nutrition: Regular exercise or taking a falls prevention class are great ways improve strength and balance. Don’t forget to stay hydrated and bring a snack!   ? Medicine side effects: Some medicines can make you sleepy or dizzy, which could cause a fall. Ask your healthcare provider about the side effects your medicines could cause. Be sure to let them know if you take any vitamins or supplements as well.   ? Tripping hazards: Remove items you could trip on, such as loose mats, rugs, cords, and clutter. Wear closed toe shoes with rubber soles.   ? Health and wellness: Get regular checkups with your healthcare provider, plus routine vision and hearing screenings. Talk with your healthcare provider about:   o Your medicines and the possible side effects - bring them in a bag if that is easier!   o Problems with balance or feeling dizzy   o Ways to promote bone health, such as Vitamin D and calcium supplements   o Questions or concerns about falling     *Ask your healthcare team if you have questions     [facility], 2022         Ways to Help Prevent Falls at Home    Quick Tips   ? Ask for help if you need it. Most people want to help!   ? Get up slowly after sitting or laying down   ? Wear a medical alert device or keep cell phone in your pocket   ? Use night lights, especially areas near a bathroom   ? Keep the items you use often within reach on a small stool or end table   ? Use an assistive device such as walker or cane, as directed by provider/physical therapy   ? Use a non-slip  mat and grab bars in your bathroom. Look for home health sections for best options     Other Areas to Focus On   ? Exercise and nutrition: Regular exercise or taking a falls prevention class are great ways improve strength and balance. Don’t forget to stay hydrated and bring a snack!   ? Medicine side effects: Some medicines can make you sleepy or dizzy, which could cause a fall. Ask your healthcare provider about the side effects your medicines could cause. Be sure to let them know if you take any vitamins or supplements as well.   ? Tripping hazards: Remove items you could trip on, such as loose mats, rugs, cords, and clutter. Wear closed toe shoes with rubber soles.   ? Health and wellness: Get regular checkups with your healthcare provider, plus routine vision and hearing screenings. Talk with your healthcare provider about:   o Your medicines and the possible side effects - bring them in a bag if that is easier!   o Problems with balance or feeling dizzy   o Ways to promote bone health, such as Vitamin D and calcium supplements   o Questions or concerns about falling     *Ask your healthcare team if you have questions     ©[facility], 2022

## 2024-06-27 NOTE — PROGRESS NOTES
Subjective   Reason for Visit: Morris Aquino is an 64 y.o. male here for a Welcome to Medicare Wellness visit.     Past Medical, Surgical, and Family History reviewed and updated in chart.    Reviewed all medications by prescribing practitioner or clinical pharmacist (such as prescriptions, OTCs, herbal therapies and supplements) and documented in the medical record.    HPI    Welcome to Medicare appt today     Pt well known to me       WME   Last one:   New to medicare this year     Concerns today:       Did see Cardiology  - to get a cath soon     Wax left ear       Filled out Medicare wellness forms - reviewed today      Last colonoscopy or colon cancer screen :     Never done   FAMILY HX OF COLON CA?  :     NONE   Agreed to cologuard     Prostate symptoms:    PSA:   2022 less than 1     Last cholesterol level:  6/2023     Last blood sugar level :    diabetic  -  3/2024     Hep C screen?  :  does not think so   HIV screen?  :       vaccines -   FLU:   UTD                      PNEUMOVAX:                     PREVNAR:  UTD                      COVID-19:  had 5                      ZOSTER:   did not get                      TETNAS/PERTUSSIS:                     RSV :  (70)                       OTHER:      vision -    last appt:    a few years ago     dentist -    last appt:   there recently     BMI/weight :    199 lb        smoking status:    NEVER           how long and how much?  :     Need Screening Lung CT?:       alcohol consumption:  NONE     Need coronary calcium score?  :   Seeing cardiology - getting eval     LIVING WILL/MEDICAL POA :   not yet             On chart?  :             Patient Care Team:  Ct Win MD as PCP - General  Ct Win MD as PCP - O Medicare Advantage PCP     Review of Systems    Objective   Vitals:  /68 (BP Location: Right arm, Patient Position: Sitting, BP Cuff Size: Large adult)   Pulse 56   Wt 90.3 kg (199 lb)   SpO2 98%   BMI 29.39 kg/m²        Physical Exam  Vitals reviewed.   Constitutional:       Appearance: Normal appearance. He is obese.      Comments: Uses cane    HENT:      Ears:      Comments: Deep cerumen left canal   Neurological:      Mental Status: He is alert.     Patient ID: Morris Aquino is a 64 y.o. male.    Ear Cerumen Removal    Date/Time: 6/27/2024 3:26 PM    Performed by: Ct Win MD  Authorized by: Ct Win MD    Consent:     Consent obtained:  Verbal    Consent given by:  Patient    Risks, benefits, and alternatives were discussed: yes      Risks discussed:  Bleeding, infection and pain    Alternatives discussed:  No treatment  Universal protocol:     Patient identity confirmed:  Verbally with patient  Procedure details:     Location:  L ear    Procedure type: irrigation      Procedure outcomes: cerumen removed (with curette after irrigation)    Post-procedure details:     Inspection:  No bleeding and ear canal clear    Hearing quality:  Improved    Procedure completion:  Tolerated well, no immediate complications    EKG  - NSR     Assessment/Plan   Problem List Items Addressed This Visit          High    Benign essential hypertension    Overview     (Official DX 2007 - started on Lisinopril - developed cough, changed to Losartan - was on for years - but then kept feeling dizzy all the time - has done better with amlodipine)          Relevant Orders    Comprehensive Metabolic Panel    CBC and Auto Differential    Hemoglobin A1C    Thyroid Stimulating Hormone    Prostate Specific Antigen    Lipid Panel    Controlled type 2 diabetes mellitus (Multi)    Overview     First diagnosed in 2008 - was only diet controlled - lost weight - sugars were normal         Relevant Orders    Comprehensive Metabolic Panel    CBC and Auto Differential    Hemoglobin A1C    Thyroid Stimulating Hormone    Prostate Specific Antigen    Lipid Panel     Other Visit Diagnoses       Welcome to Medicare preventive visit    -   Primary    Relevant Orders    ECG 12 lead (Clinic Performed)    Routine general medical examination at health care facility        Screen for colon cancer        Relevant Orders    Cologuard® colon cancer screening    Screening for prostate cancer        Relevant Orders    Prostate Specific Antigen    Need for hepatitis C screening test        Relevant Orders    Hepatitis C Antibody           Patient was identified as a fall risk. Risk prevention instructions provided.    Welcome to Medicare today -   I see him regular for chronic issues     Labs today     Irrigated left canal successfully     We discussed at visit any disease processes that were of concern as well as the risks, benefits and instructions of any new medication provided.    See orders and discussion section for information handed to patient on their Clinical Summary.   Patient (and/or caretaker of patient if present)  stated all questions were answered, and they voiced understanding of instructions.

## 2024-06-28 LAB
EST. AVERAGE GLUCOSE BLD GHB EST-MCNC: 148 MG/DL
HBA1C MFR BLD: 6.8 %
HCV AB SER QL: NONREACTIVE
PSA SERPL-MCNC: 0.31 NG/ML

## 2024-07-10 ENCOUNTER — TELEPHONE (OUTPATIENT)
Dept: PRIMARY CARE | Facility: CLINIC | Age: 64
End: 2024-07-10
Payer: MEDICARE

## 2024-07-10 NOTE — TELEPHONE ENCOUNTER
Left message on his voice mail to call the secureach card.  I let him know the jist but told him to hear more details he needs to call in and listen to the message.

## 2024-07-10 NOTE — TELEPHONE ENCOUNTER
----- Message from Ct Win sent at 7/10/2024  6:19 AM EDT -----  Regarding: labs  Please call pt about labs from June - he did not call his [employer] card

## 2024-07-15 DIAGNOSIS — E03.9 HYPOTHYROIDISM, UNSPECIFIED TYPE: ICD-10-CM

## 2024-07-15 RX ORDER — LEVOTHYROXINE SODIUM 100 UG/1
100 TABLET ORAL DAILY
Qty: 90 TABLET | Refills: 3 | Status: SHIPPED | OUTPATIENT
Start: 2024-07-15

## 2024-08-03 DIAGNOSIS — I48.0 PAROXYSMAL A-FIB (MULTI): ICD-10-CM

## 2024-08-05 PROBLEM — R00.2 PALPITATIONS: Status: ACTIVE | Noted: 2024-04-29

## 2024-08-05 PROBLEM — I48.3 TYPICAL ATRIAL FLUTTER (MULTI): Status: ACTIVE | Noted: 2024-04-29

## 2024-08-05 RX ORDER — METOPROLOL TARTRATE 25 MG/1
12.5 TABLET, FILM COATED ORAL 2 TIMES DAILY
Qty: 90 TABLET | Refills: 3 | Status: SHIPPED | OUTPATIENT
Start: 2024-08-05

## 2024-09-09 ENCOUNTER — LAB (OUTPATIENT)
Dept: LAB | Facility: LAB | Age: 64
End: 2024-09-09
Payer: MEDICARE

## 2024-09-09 DIAGNOSIS — R00.2 PALPITATIONS: ICD-10-CM

## 2024-09-09 DIAGNOSIS — I48.3 TYPICAL ATRIAL FLUTTER (MULTI): ICD-10-CM

## 2024-09-09 DIAGNOSIS — I10 BENIGN ESSENTIAL HYPERTENSION: ICD-10-CM

## 2024-09-09 LAB
ANION GAP SERPL CALC-SCNC: 15 MMOL/L
BUN SERPL-MCNC: 16 MG/DL (ref 8–25)
CALCIUM SERPL-MCNC: 9.5 MG/DL (ref 8.5–10.4)
CHLORIDE SERPL-SCNC: 104 MMOL/L (ref 97–107)
CO2 SERPL-SCNC: 25 MMOL/L (ref 24–31)
CREAT SERPL-MCNC: 1.1 MG/DL (ref 0.4–1.6)
EGFRCR SERPLBLD CKD-EPI 2021: 74 ML/MIN/1.73M*2
ERYTHROCYTE [DISTWIDTH] IN BLOOD BY AUTOMATED COUNT: 13.1 % (ref 11.5–14.5)
GLUCOSE SERPL-MCNC: 109 MG/DL (ref 65–99)
HCT VFR BLD AUTO: 42.9 % (ref 41–52)
HGB BLD-MCNC: 14.4 G/DL (ref 13.5–17.5)
INR PPP: 1.2 (ref 0.9–1.2)
MCH RBC QN AUTO: 29 PG (ref 26–34)
MCHC RBC AUTO-ENTMCNC: 33.6 G/DL (ref 32–36)
MCV RBC AUTO: 87 FL (ref 80–100)
NRBC BLD-RTO: 0 /100 WBCS (ref 0–0)
PLATELET # BLD AUTO: 192 X10*3/UL (ref 150–450)
POTASSIUM SERPL-SCNC: 4.6 MMOL/L (ref 3.4–5.1)
PROTHROMBIN TIME: 12.1 SECONDS (ref 9.3–12.7)
RBC # BLD AUTO: 4.96 X10*6/UL (ref 4.5–5.9)
SODIUM SERPL-SCNC: 144 MMOL/L (ref 133–145)
WBC # BLD AUTO: 11.7 X10*3/UL (ref 4.4–11.3)

## 2024-09-09 PROCEDURE — 80048 BASIC METABOLIC PNL TOTAL CA: CPT

## 2024-09-09 PROCEDURE — 36415 COLL VENOUS BLD VENIPUNCTURE: CPT

## 2024-09-09 PROCEDURE — 85610 PROTHROMBIN TIME: CPT

## 2024-09-09 PROCEDURE — 85027 COMPLETE CBC AUTOMATED: CPT

## 2024-09-27 ENCOUNTER — APPOINTMENT (OUTPATIENT)
Dept: PRIMARY CARE | Facility: CLINIC | Age: 64
End: 2024-09-27
Payer: MEDICARE

## 2024-09-27 VITALS
OXYGEN SATURATION: 98 % | BODY MASS INDEX: 29.09 KG/M2 | HEART RATE: 68 BPM | WEIGHT: 197 LBS | SYSTOLIC BLOOD PRESSURE: 120 MMHG | DIASTOLIC BLOOD PRESSURE: 68 MMHG

## 2024-09-27 DIAGNOSIS — G60.0 CMT (CHARCOT-MARIE-TOOTH DISEASE): ICD-10-CM

## 2024-09-27 DIAGNOSIS — E55.9 VITAMIN D DEFICIENCY: ICD-10-CM

## 2024-09-27 DIAGNOSIS — E11.8 CONTROLLED TYPE 2 DIABETES MELLITUS WITH COMPLICATION, WITHOUT LONG-TERM CURRENT USE OF INSULIN (MULTI): Primary | ICD-10-CM

## 2024-09-27 DIAGNOSIS — E03.9 HYPOTHYROIDISM, UNSPECIFIED TYPE: ICD-10-CM

## 2024-09-27 DIAGNOSIS — I48.0 PAROXYSMAL A-FIB (MULTI): ICD-10-CM

## 2024-09-27 DIAGNOSIS — F32.A CHRONIC DEPRESSION: ICD-10-CM

## 2024-09-27 DIAGNOSIS — I10 BENIGN ESSENTIAL HYPERTENSION: ICD-10-CM

## 2024-09-27 LAB — POC HEMOGLOBIN A1C: 6.2 % (ref 4.2–6.5)

## 2024-09-27 PROCEDURE — 1160F RVW MEDS BY RX/DR IN RCRD: CPT | Performed by: FAMILY MEDICINE

## 2024-09-27 PROCEDURE — 99214 OFFICE O/P EST MOD 30 MIN: CPT | Performed by: FAMILY MEDICINE

## 2024-09-27 PROCEDURE — 3048F LDL-C <100 MG/DL: CPT | Performed by: FAMILY MEDICINE

## 2024-09-27 PROCEDURE — 1159F MED LIST DOCD IN RCRD: CPT | Performed by: FAMILY MEDICINE

## 2024-09-27 PROCEDURE — 3078F DIAST BP <80 MM HG: CPT | Performed by: FAMILY MEDICINE

## 2024-09-27 PROCEDURE — 1124F ACP DISCUSS-NO DSCNMKR DOCD: CPT | Performed by: FAMILY MEDICINE

## 2024-09-27 PROCEDURE — 90662 IIV NO PRSV INCREASED AG IM: CPT | Performed by: FAMILY MEDICINE

## 2024-09-27 PROCEDURE — G0008 ADMIN INFLUENZA VIRUS VAC: HCPCS | Performed by: FAMILY MEDICINE

## 2024-09-27 PROCEDURE — 3074F SYST BP LT 130 MM HG: CPT | Performed by: FAMILY MEDICINE

## 2024-09-27 PROCEDURE — 1036F TOBACCO NON-USER: CPT | Performed by: FAMILY MEDICINE

## 2024-09-27 PROCEDURE — 83036 HEMOGLOBIN GLYCOSYLATED A1C: CPT | Performed by: FAMILY MEDICINE

## 2024-09-27 PROCEDURE — 3044F HG A1C LEVEL LT 7.0%: CPT | Performed by: FAMILY MEDICINE

## 2024-09-27 RX ORDER — CHOLECALCIFEROL (VITAMIN D3) 50 MCG
50 TABLET ORAL DAILY
Start: 2024-09-27

## 2024-09-27 RX ORDER — METOPROLOL SUCCINATE 25 MG/1
12.5 TABLET, EXTENDED RELEASE ORAL NIGHTLY
Qty: 45 TABLET | Refills: 3 | Status: SHIPPED | OUTPATIENT
Start: 2024-09-27 | End: 2025-09-22
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
## 2024-09-27 NOTE — PATIENT INSTRUCTIONS
Flu shot today   Think about getting a covid booster,  the shingrix shots  (2 doses -  2 - 6 months apoart)   and the RSV vaccine -   Space them out by a few weeks.        Do your cologuard!       Lets change your Metoprolol to Metoprolol XL 25 mg -   1/2 each PM       Diabetic  Patient Reminders:    Please check your blood sugars  - on average - three times a week in the morning, and three times a week 2 hours after a meal.  That gives me a good idea of the range of your sugars.  If you do this regularly, it will help you learn how your body responds to your nutrition and exercise.  If you do not want to do this regularly, bringing sugars from the latest 2 weeks before your appointment will help.      PLEASE KEEP A LOGBOOK OF THESE SUGARS.    It is very important for you to have a regular exercise routine - at least 30 minutes 5 days a week.    It is important to inspect your feet regularly, as diabetics often get numbness of their feet and then cannot feel wounds.    It is generally recommended for diabetics to take one baby aspirin daily. Be sure to clarify this with me if I have not told you to do this.    It is very important for you to get a yearly flu shot and a PREVNAR 20 vaccine to help prevent pneumonia.    Please consider getting vaccinated against COVID-19 as well.        Watching your nutrition is VERY important  - always! You want to be eating a diet low in sodium (1534-2799 mg a day); low in starches and sweets; plenty of fresh fruits and veggies (the fresher the better); whole grains; lean meats and dairy that are low in saturated and trans fats.    Watch the amounts you eat as well - pay attention to serving size.    It is important for diabetics to see their eye doctor as least once year, and their dentist every 6 months.         For General Healthy Nutrition    (Remember - NOT A DIET!   Diets are only good for class reunions.)    These are my general good nutrition recommendations for most people.  "  I use the term \" diet \"  in these instructions to mean your overall nutrition - how you eat and drink.   If we talked about something different during your visit with me,  other than what is written below,  follow that advice instead.       For most people,  eating healthier means getting less added sugar and less processed foods in your diet    The fresher the better.    Added sugar is now a part of the nutrition label on manufactured food, so you can keep an eye on it easier.    But basically,  foods and beverages  that contain regular sugar and corn syrup are the main sources of added sugars.  Eating as little of these foods as you can is best.   One shocking example of the epidemic of added sugar is soda.    One can of regular soda contains about as much added sugar as 3 regular size doughnuts!     The other issue with processed foods is the amount of processed grains they contain , such as white flour.    This is also something you want to try to limit in your diet.     But, grain products are very important for your nutrition.    Whole grains are better for your body.     Cutting back on white breads, traditional pasta, baked goods, white rice,  and processed cereals will be healthier for you.   The better choices include whole grain breads,  whole wheat pasta,  brown rice, quinoa, barley, steel cut  or rolled oats.   If you eat cereal for breakfast, try to look for one made with whole grains and less sugar.   There are many people who have a problem with gluten, for a large variety of reasons.    Generally,  products made with wheat flour , barley or rye are the primary source of gluten.       Cutting back on saturated fats is important.    You want the majority of the meat that you eat to be chicken, fish or turkey.   Baked or broiled is best -  fried adds too much fat.    There are healthy fats that are important - fat is important for holding down appetite, vitamin absorption and several metabolic " "processes in the body.  Monounsaturated fats raise HDL (good cholesterol) and lower LDL (bad cholesterol).   Olive oil, peanut oil, nuts, seeds, and avocados are great sources of the good fats.       Ideas are:   Trade sour cream dip for hummus (which is rich in olive oil) or guacamole; use veggies or whole-wheat chips to dip.    Nuts are an excellent source of protein and healthy fats.   Tree nuts are the best kind, such as almonds or walnuts.   Just be careful - they are high in calories, so stick to a serving size.  (Most are about 200 calories for a 1/4 cup)      Proteins are very important for your body, and they also hold down your appetite.   Try to have protein with every meal.    These generally are meats, nuts, many beans, legumes, eggs, and dairy.   You will find protein in whole grain products and some green vegetables have a little too.     When you have dairy (if you can - many people are lactose intolerant) try to make it low fat.    Ideas are 1% milk, lowfat yogurt or cheeses, low fat cottage cheese.   I don't generally recommend FAT FREE because they often contain artificial products to improve taste, and the fat helps hold down your appetite.   If you are lactose intolerant, try to see if taking Lactaid before having dairy helps.      Fresh fruits and vegetables are VERY important.  The brighter the better.   Many vegetables are considered \"Free Foods\" - meaning you can eat as much as you want, and it does not matter.  These include tomatoes, cucumbers, celery, peppers, all the various lettuces and kale - to name a few.   Potatoes, corn and peas are starchy, so do have more calories, but are still healthy - you just want to watch the amount of them you eat.       Fruits are full of wonderful nutrition.   They contain natural sugar called fructose, so eating them in moderation is best.   Diabetics may need to pay careful attention to how their body reacts to the sugar.  Some fruits might drastically " increase their blood sugar.      Eating smaller meals with a couple of small snacks is better for your metabolism than not eating for long amounts of time  (breakfast is very important).   Trying to avoid large meals is helpful too.    Eating like this helps keep your appetite down and keeps you in burning metabolism rather than storage metabolism so your body will use the calories you eat.       I do not tell people to stop eating sweets or snack foods - just limit the amounts you have.  The less the better.   Pay attention to serving sizes, and treat them as a treat.        Foods like doughnuts, pop tarts, sugar cereals, cookies  ARE NOT GOOD FOR BREAKFAST.   They are loaded with sugar and will cause you to be hungrier in the day and often not feel well.    Caffeine needs to be limited - no more than 2 servings a day.  Some people can't have any at all.    (if you have any sleep or anxiety issues - stop the caffeine)   Coffee, many teas, many sodas, energy drinks, almost any diet supplement,  and chocolate all contain caffeine.      Water is important.   For most people, 8   x  8 ounces  a day are needed.  This may vary for some health issues.    If you need to be on a low sodium diet, that means looking at labels and eating only 1000 - 2000 mg of sodium a day.    Calcium intake is important.  3 servings of a high calcium food or drink a day is recommended.   This is usually a cup of milk, a cup of yogurt, an ounce of a hard cheese or 1.5 ounces of a soft cheese are the usual servings.   There are other high calcium foods - including soy or almond milk, broccoli,  almonds, dark green leafy vegetable.   Make sure you are not getting more than 1000  - 1200 mg of total calcium a day (unless you have been told you need more by a doctor).    Vitamin D 3 is important to absorb the calcium and for your immune system.   For children, 400 IU a day is recommended.   For adults - 800 - 5000 IU a day  is recommended.  (Often  the amount needed is individualized for adults - be sure to ask how much is right for you)    Physical activity is very important for good health.    Finding activities that give you regular exercise is very important for good health.  Try to find exercise you enjoy doing on a regular basis.    30 minutes at least 5 days a week of a good cardiovascular exercise is recommended.   That means something that gets your heart rate going faster than your usual baseline and you can find yourself breathing harder than usual while you are exercising.  If you have not done any exercise in a long time,  make sure you ask if its safe for you to start,  and be sure to gradually work up to your goal.      If you need to lose weight,  following these recommendations will help you.   And if you are doing all of this and still not losing weight, then its likely just the amount of food you are eating.   Learn to cut back on portion sizes.  Using smaller plates may help.  Healthy weight loss is  only about a pound a week.   You have to remember that whatever you do to lose the weight, you must be prepared to keep it up for life for the weight to stay off.     A lot of people have a lot of luck with using something like a fit bit,  or a program where you keep track of all of your calories that you eat and what you burn off in the day.

## 2024-10-19 DIAGNOSIS — M15.0 PRIMARY OSTEOARTHRITIS INVOLVING MULTIPLE JOINTS: ICD-10-CM

## 2024-10-21 RX ORDER — TRAMADOL HYDROCHLORIDE 50 MG/1
50 TABLET ORAL EVERY 6 HOURS PRN
Qty: 360 TABLET | Refills: 0 | Status: SHIPPED | OUTPATIENT
Start: 2024-10-21

## 2024-11-18 ENCOUNTER — DOCUMENTATION (OUTPATIENT)
Dept: PRIMARY CARE | Facility: CLINIC | Age: 64
End: 2024-11-18

## 2024-11-18 ENCOUNTER — DOCUMENTATION (OUTPATIENT)
Dept: PRIMARY CARE | Facility: CLINIC | Age: 64
End: 2024-11-18
Payer: MEDICARE

## 2024-11-18 ENCOUNTER — HOSPITAL ENCOUNTER (OUTPATIENT)
Facility: HOSPITAL | Age: 64
Setting detail: OBSERVATION
Discharge: HOME | End: 2024-11-19
Attending: STUDENT IN AN ORGANIZED HEALTH CARE EDUCATION/TRAINING PROGRAM | Admitting: INTERNAL MEDICINE
Payer: MEDICARE

## 2024-11-18 ENCOUNTER — APPOINTMENT (OUTPATIENT)
Dept: CARDIOLOGY | Facility: HOSPITAL | Age: 64
End: 2024-11-18
Payer: MEDICARE

## 2024-11-18 ENCOUNTER — APPOINTMENT (OUTPATIENT)
Dept: RADIOLOGY | Facility: HOSPITAL | Age: 64
End: 2024-11-18
Payer: MEDICARE

## 2024-11-18 DIAGNOSIS — I48.91 ATRIAL FIB/FLUTTER, TRANSIENT (MULTI): ICD-10-CM

## 2024-11-18 DIAGNOSIS — I48.91 ATRIAL FIBRILLATION AND FLUTTER: Primary | ICD-10-CM

## 2024-11-18 DIAGNOSIS — I48.0 PAROXYSMAL ATRIAL FIBRILLATION (MULTI): ICD-10-CM

## 2024-11-18 DIAGNOSIS — I48.0 PAROXYSMAL A-FIB (MULTI): ICD-10-CM

## 2024-11-18 DIAGNOSIS — I48.92 ATRIAL FIB/FLUTTER, TRANSIENT (MULTI): ICD-10-CM

## 2024-11-18 DIAGNOSIS — R94.31 ABNORMAL EKG: ICD-10-CM

## 2024-11-18 DIAGNOSIS — I48.3 TYPICAL ATRIAL FLUTTER (MULTI): ICD-10-CM

## 2024-11-18 DIAGNOSIS — I48.92 ATRIAL FIBRILLATION AND FLUTTER: Primary | ICD-10-CM

## 2024-11-18 LAB
ALBUMIN SERPL BCP-MCNC: 4.1 G/DL (ref 3.4–5)
ALP SERPL-CCNC: 76 U/L (ref 33–136)
ALT SERPL W P-5'-P-CCNC: 26 U/L (ref 10–52)
ANION GAP SERPL CALC-SCNC: 19 MMOL/L (ref 10–20)
AST SERPL W P-5'-P-CCNC: 21 U/L (ref 9–39)
BILIRUB SERPL-MCNC: 0.7 MG/DL (ref 0–1.2)
BNP SERPL-MCNC: 258 PG/ML (ref 0–99)
BUN SERPL-MCNC: 14 MG/DL (ref 6–23)
CALCIUM SERPL-MCNC: 9 MG/DL (ref 8.6–10.3)
CARDIAC TROPONIN I PNL SERPL HS: 21 NG/L (ref 0–20)
CARDIAC TROPONIN I PNL SERPL HS: 23 NG/L (ref 0–20)
CHLORIDE SERPL-SCNC: 98 MMOL/L (ref 98–107)
CO2 SERPL-SCNC: 23 MMOL/L (ref 21–32)
CREAT SERPL-MCNC: 1.08 MG/DL (ref 0.5–1.3)
EGFRCR SERPLBLD CKD-EPI 2021: 76 ML/MIN/1.73M*2
GLUCOSE SERPL-MCNC: 147 MG/DL (ref 74–99)
MAGNESIUM SERPL-MCNC: 1.64 MG/DL (ref 1.6–2.4)
PHOSPHATE SERPL-MCNC: 3 MG/DL (ref 2.5–4.9)
POTASSIUM SERPL-SCNC: 4.7 MMOL/L (ref 3.5–5.3)
PROT SERPL-MCNC: 6.9 G/DL (ref 6.4–8.2)
SODIUM SERPL-SCNC: 135 MMOL/L (ref 136–145)
T4 FREE SERPL-MCNC: 1.17 NG/DL (ref 0.61–1.12)
TSH SERPL-ACNC: 6.44 MIU/L (ref 0.44–3.98)

## 2024-11-18 PROCEDURE — 36415 COLL VENOUS BLD VENIPUNCTURE: CPT

## 2024-11-18 PROCEDURE — 93005 ELECTROCARDIOGRAM TRACING: CPT

## 2024-11-18 PROCEDURE — 2500000005 HC RX 250 GENERAL PHARMACY W/O HCPCS

## 2024-11-18 PROCEDURE — 80053 COMPREHEN METABOLIC PANEL: CPT

## 2024-11-18 PROCEDURE — 2500000004 HC RX 250 GENERAL PHARMACY W/ HCPCS (ALT 636 FOR OP/ED): Performed by: INTERNAL MEDICINE

## 2024-11-18 PROCEDURE — 84443 ASSAY THYROID STIM HORMONE: CPT

## 2024-11-18 PROCEDURE — 71045 X-RAY EXAM CHEST 1 VIEW: CPT

## 2024-11-18 PROCEDURE — 2500000001 HC RX 250 WO HCPCS SELF ADMINISTERED DRUGS (ALT 637 FOR MEDICARE OP): Performed by: INTERNAL MEDICINE

## 2024-11-18 PROCEDURE — 96376 TX/PRO/DX INJ SAME DRUG ADON: CPT

## 2024-11-18 PROCEDURE — 83880 ASSAY OF NATRIURETIC PEPTIDE: CPT

## 2024-11-18 PROCEDURE — 84484 ASSAY OF TROPONIN QUANT: CPT

## 2024-11-18 PROCEDURE — 84100 ASSAY OF PHOSPHORUS: CPT

## 2024-11-18 PROCEDURE — G0378 HOSPITAL OBSERVATION PER HR: HCPCS

## 2024-11-18 PROCEDURE — 71045 X-RAY EXAM CHEST 1 VIEW: CPT | Performed by: RADIOLOGY

## 2024-11-18 PROCEDURE — 99223 1ST HOSP IP/OBS HIGH 75: CPT | Performed by: INTERNAL MEDICINE

## 2024-11-18 PROCEDURE — 96375 TX/PRO/DX INJ NEW DRUG ADDON: CPT

## 2024-11-18 PROCEDURE — 83735 ASSAY OF MAGNESIUM: CPT

## 2024-11-18 PROCEDURE — 84439 ASSAY OF FREE THYROXINE: CPT

## 2024-11-18 RX ORDER — ACETAMINOPHEN 650 MG/1
650 SUPPOSITORY RECTAL EVERY 4 HOURS PRN
Status: DISCONTINUED | OUTPATIENT
Start: 2024-11-18 | End: 2024-11-20 | Stop reason: HOSPADM

## 2024-11-18 RX ORDER — ONDANSETRON HYDROCHLORIDE 2 MG/ML
4 INJECTION, SOLUTION INTRAVENOUS EVERY 8 HOURS PRN
Status: DISCONTINUED | OUTPATIENT
Start: 2024-11-18 | End: 2024-11-20 | Stop reason: HOSPADM

## 2024-11-18 RX ORDER — ACETAMINOPHEN 160 MG/5ML
650 SOLUTION ORAL EVERY 4 HOURS PRN
Status: DISCONTINUED | OUTPATIENT
Start: 2024-11-18 | End: 2024-11-20 | Stop reason: HOSPADM

## 2024-11-18 RX ORDER — ACETAMINOPHEN 325 MG/1
650 TABLET ORAL EVERY 4 HOURS PRN
Status: DISCONTINUED | OUTPATIENT
Start: 2024-11-18 | End: 2024-11-20 | Stop reason: HOSPADM

## 2024-11-18 RX ORDER — CALCIUM CARBONATE 200(500)MG
500 TABLET,CHEWABLE ORAL 4 TIMES DAILY PRN
Status: DISCONTINUED | OUTPATIENT
Start: 2024-11-18 | End: 2024-11-20 | Stop reason: HOSPADM

## 2024-11-18 RX ORDER — ONDANSETRON 4 MG/1
4 TABLET, FILM COATED ORAL EVERY 8 HOURS PRN
Status: DISCONTINUED | OUTPATIENT
Start: 2024-11-18 | End: 2024-11-20 | Stop reason: HOSPADM

## 2024-11-18 RX ORDER — GUAIFENESIN 600 MG/1
600 TABLET, EXTENDED RELEASE ORAL EVERY 12 HOURS PRN
Status: DISCONTINUED | OUTPATIENT
Start: 2024-11-18 | End: 2024-11-20 | Stop reason: HOSPADM

## 2024-11-18 RX ORDER — CHOLECALCIFEROL (VITAMIN D3) 25 MCG
2000 TABLET ORAL DAILY
Status: DISCONTINUED | OUTPATIENT
Start: 2024-11-19 | End: 2024-11-20 | Stop reason: HOSPADM

## 2024-11-18 RX ORDER — TRAMADOL HYDROCHLORIDE 50 MG/1
50 TABLET ORAL EVERY 6 HOURS PRN
Status: DISCONTINUED | OUTPATIENT
Start: 2024-11-18 | End: 2024-11-20 | Stop reason: HOSPADM

## 2024-11-18 RX ORDER — ALUMINUM HYDROXIDE, MAGNESIUM HYDROXIDE, AND SIMETHICONE 1200; 120; 1200 MG/30ML; MG/30ML; MG/30ML
30 SUSPENSION ORAL EVERY 6 HOURS PRN
Status: DISCONTINUED | OUTPATIENT
Start: 2024-11-18 | End: 2024-11-20 | Stop reason: HOSPADM

## 2024-11-18 RX ORDER — CETIRIZINE HYDROCHLORIDE 10 MG/1
10 TABLET ORAL DAILY
Status: DISCONTINUED | OUTPATIENT
Start: 2024-11-19 | End: 2024-11-20 | Stop reason: HOSPADM

## 2024-11-18 RX ORDER — DILTIAZEM HYDROCHLORIDE 5 MG/ML
25 INJECTION INTRAVENOUS ONCE
Status: COMPLETED | OUTPATIENT
Start: 2024-11-18 | End: 2024-11-18

## 2024-11-18 RX ORDER — METFORMIN HYDROCHLORIDE 500 MG/1
1000 TABLET ORAL
Status: DISCONTINUED | OUTPATIENT
Start: 2024-11-19 | End: 2024-11-20 | Stop reason: HOSPADM

## 2024-11-18 RX ORDER — ATORVASTATIN CALCIUM 80 MG/1
80 TABLET, FILM COATED ORAL NIGHTLY
Status: DISCONTINUED | OUTPATIENT
Start: 2024-11-18 | End: 2024-11-20 | Stop reason: HOSPADM

## 2024-11-18 RX ORDER — METOPROLOL SUCCINATE 25 MG/1
12.5 TABLET, EXTENDED RELEASE ORAL
Qty: 180 TABLET | Refills: 0 | Status: ON HOLD | OUTPATIENT
Start: 2024-11-18 | End: 2024-11-19

## 2024-11-18 RX ORDER — TAMSULOSIN HYDROCHLORIDE 0.4 MG/1
0.4 CAPSULE ORAL DAILY
Status: DISCONTINUED | OUTPATIENT
Start: 2024-11-19 | End: 2024-11-20 | Stop reason: HOSPADM

## 2024-11-18 RX ORDER — LEVOTHYROXINE SODIUM 100 UG/1
100 TABLET ORAL DAILY
Status: DISCONTINUED | OUTPATIENT
Start: 2024-11-19 | End: 2024-11-20 | Stop reason: HOSPADM

## 2024-11-18 RX ORDER — ASPIRIN 81 MG/1
81 TABLET ORAL DAILY
Status: DISCONTINUED | OUTPATIENT
Start: 2024-11-19 | End: 2024-11-19

## 2024-11-18 RX ORDER — METOPROLOL SUCCINATE 25 MG/1
25 TABLET, EXTENDED RELEASE ORAL DAILY
Status: DISCONTINUED | OUTPATIENT
Start: 2024-11-19 | End: 2024-11-20 | Stop reason: HOSPADM

## 2024-11-18 RX ORDER — METOPROLOL TARTRATE 1 MG/ML
5 INJECTION, SOLUTION INTRAVENOUS ONCE
Status: COMPLETED | OUTPATIENT
Start: 2024-11-18 | End: 2024-11-18

## 2024-11-18 RX ORDER — PREGABALIN 75 MG/1
75 CAPSULE ORAL 3 TIMES DAILY
Status: DISCONTINUED | OUTPATIENT
Start: 2024-11-18 | End: 2024-11-20 | Stop reason: HOSPADM

## 2024-11-18 RX ORDER — PANTOPRAZOLE SODIUM 40 MG/1
40 TABLET, DELAYED RELEASE ORAL
Status: DISCONTINUED | OUTPATIENT
Start: 2024-11-19 | End: 2024-11-20 | Stop reason: HOSPADM

## 2024-11-18 RX ORDER — GUAIFENESIN/DEXTROMETHORPHAN 100-10MG/5
5 SYRUP ORAL EVERY 4 HOURS PRN
Status: DISCONTINUED | OUTPATIENT
Start: 2024-11-18 | End: 2024-11-20 | Stop reason: HOSPADM

## 2024-11-18 RX ORDER — DOCUSATE SODIUM 100 MG/1
100 CAPSULE, LIQUID FILLED ORAL 2 TIMES DAILY
Status: DISCONTINUED | OUTPATIENT
Start: 2024-11-18 | End: 2024-11-20 | Stop reason: HOSPADM

## 2024-11-18 SDOH — ECONOMIC STABILITY: FOOD INSECURITY: WITHIN THE PAST 12 MONTHS, YOU WORRIED THAT YOUR FOOD WOULD RUN OUT BEFORE YOU GOT THE MONEY TO BUY MORE.: NEVER TRUE

## 2024-11-18 SDOH — SOCIAL STABILITY: SOCIAL INSECURITY: HAS ANYONE EVER THREATENED TO HURT YOUR FAMILY OR YOUR PETS?: NO

## 2024-11-18 SDOH — SOCIAL STABILITY: SOCIAL INSECURITY
WITHIN THE LAST YEAR, HAVE YOU BEEN KICKED, HIT, SLAPPED, OR OTHERWISE PHYSICALLY HURT BY YOUR PARTNER OR EX-PARTNER?: NO

## 2024-11-18 SDOH — ECONOMIC STABILITY: INCOME INSECURITY: IN THE PAST 12 MONTHS HAS THE ELECTRIC, GAS, OIL, OR WATER COMPANY THREATENED TO SHUT OFF SERVICES IN YOUR HOME?: NO

## 2024-11-18 SDOH — HEALTH STABILITY: MENTAL HEALTH: HOW MANY DRINKS CONTAINING ALCOHOL DO YOU HAVE ON A TYPICAL DAY WHEN YOU ARE DRINKING?: PATIENT DOES NOT DRINK

## 2024-11-18 SDOH — SOCIAL STABILITY: SOCIAL INSECURITY: ABUSE: ADULT

## 2024-11-18 SDOH — ECONOMIC STABILITY: HOUSING INSECURITY: IN THE PAST 12 MONTHS, HOW MANY TIMES HAVE YOU MOVED WHERE YOU WERE LIVING?: 0

## 2024-11-18 SDOH — SOCIAL STABILITY: SOCIAL INSECURITY: WERE YOU ABLE TO COMPLETE ALL THE BEHAVIORAL HEALTH SCREENINGS?: YES

## 2024-11-18 SDOH — HEALTH STABILITY: MENTAL HEALTH: HOW OFTEN DO YOU HAVE SIX OR MORE DRINKS ON ONE OCCASION?: NEVER

## 2024-11-18 SDOH — SOCIAL STABILITY: SOCIAL INSECURITY: DO YOU FEEL ANYONE HAS EXPLOITED OR TAKEN ADVANTAGE OF YOU FINANCIALLY OR OF YOUR PERSONAL PROPERTY?: YES

## 2024-11-18 SDOH — HEALTH STABILITY: MENTAL HEALTH: HOW OFTEN DO YOU HAVE A DRINK CONTAINING ALCOHOL?: NEVER

## 2024-11-18 SDOH — ECONOMIC STABILITY: FOOD INSECURITY: WITHIN THE PAST 12 MONTHS, THE FOOD YOU BOUGHT JUST DIDN'T LAST AND YOU DIDN'T HAVE MONEY TO GET MORE.: NEVER TRUE

## 2024-11-18 SDOH — SOCIAL STABILITY: SOCIAL INSECURITY: HAVE YOU HAD THOUGHTS OF HARMING ANYONE ELSE?: NO

## 2024-11-18 SDOH — SOCIAL STABILITY: SOCIAL INSECURITY: WITHIN THE LAST YEAR, HAVE YOU BEEN HUMILIATED OR EMOTIONALLY ABUSED IN OTHER WAYS BY YOUR PARTNER OR EX-PARTNER?: NO

## 2024-11-18 SDOH — SOCIAL STABILITY: SOCIAL INSECURITY: HAVE YOU HAD ANY THOUGHTS OF HARMING ANYONE ELSE?: NO

## 2024-11-18 SDOH — SOCIAL STABILITY: SOCIAL INSECURITY: ARE THERE ANY APPARENT SIGNS OF INJURIES/BEHAVIORS THAT COULD BE RELATED TO ABUSE/NEGLECT?: NO

## 2024-11-18 SDOH — SOCIAL STABILITY: SOCIAL INSECURITY
WITHIN THE LAST YEAR, HAVE YOU BEEN RAPED OR FORCED TO HAVE ANY KIND OF SEXUAL ACTIVITY BY YOUR PARTNER OR EX-PARTNER?: NO

## 2024-11-18 SDOH — SOCIAL STABILITY: SOCIAL INSECURITY: ARE YOU OR HAVE YOU BEEN THREATENED OR ABUSED PHYSICALLY, EMOTIONALLY, OR SEXUALLY BY ANYONE?: NO

## 2024-11-18 SDOH — SOCIAL STABILITY: SOCIAL INSECURITY: WITHIN THE LAST YEAR, HAVE YOU BEEN AFRAID OF YOUR PARTNER OR EX-PARTNER?: NO

## 2024-11-18 SDOH — SOCIAL STABILITY: SOCIAL INSECURITY: DOES ANYONE TRY TO KEEP YOU FROM HAVING/CONTACTING OTHER FRIENDS OR DOING THINGS OUTSIDE YOUR HOME?: NO

## 2024-11-18 SDOH — SOCIAL STABILITY: SOCIAL INSECURITY: DO YOU FEEL UNSAFE GOING BACK TO THE PLACE WHERE YOU ARE LIVING?: NO

## 2024-11-18 ASSESSMENT — LIFESTYLE VARIABLES
TOTAL SCORE: 0
SKIP TO QUESTIONS 9-10: 1
AUDIT-C TOTAL SCORE: 0
EVER HAD A DRINK FIRST THING IN THE MORNING TO STEADY YOUR NERVES TO GET RID OF A HANGOVER: NO
EVER FELT BAD OR GUILTY ABOUT YOUR DRINKING: NO
HAVE PEOPLE ANNOYED YOU BY CRITICIZING YOUR DRINKING: NO
HAVE YOU EVER FELT YOU SHOULD CUT DOWN ON YOUR DRINKING: NO

## 2024-11-18 ASSESSMENT — ACTIVITIES OF DAILY LIVING (ADL)
JUDGMENT_ADEQUATE_SAFELY_COMPLETE_DAILY_ACTIVITIES: YES
LACK_OF_TRANSPORTATION: NO
DRESSING YOURSELF: INDEPENDENT
HEARING - LEFT EAR: FUNCTIONAL
HEARING - RIGHT EAR: FUNCTIONAL
WALKS IN HOME: INDEPENDENT
FEEDING YOURSELF: INDEPENDENT
GROOMING: INDEPENDENT
BATHING: INDEPENDENT
ADEQUATE_TO_COMPLETE_ADL: YES
TOILETING: INDEPENDENT
PATIENT'S MEMORY ADEQUATE TO SAFELY COMPLETE DAILY ACTIVITIES?: YES

## 2024-11-18 ASSESSMENT — COGNITIVE AND FUNCTIONAL STATUS - GENERAL
PATIENT BASELINE BEDBOUND: NO
MOBILITY SCORE: 24
DAILY ACTIVITIY SCORE: 24

## 2024-11-18 ASSESSMENT — PAIN SCALES - GENERAL
PAINLEVEL_OUTOF10: 0 - NO PAIN

## 2024-11-18 ASSESSMENT — PATIENT HEALTH QUESTIONNAIRE - PHQ9
2. FEELING DOWN, DEPRESSED OR HOPELESS: SEVERAL DAYS
SUM OF ALL RESPONSES TO PHQ9 QUESTIONS 1 & 2: 2
1. LITTLE INTEREST OR PLEASURE IN DOING THINGS: SEVERAL DAYS

## 2024-11-18 ASSESSMENT — PAIN - FUNCTIONAL ASSESSMENT: PAIN_FUNCTIONAL_ASSESSMENT: 0-10

## 2024-11-18 ASSESSMENT — COLUMBIA-SUICIDE SEVERITY RATING SCALE - C-SSRS
2. HAVE YOU ACTUALLY HAD ANY THOUGHTS OF KILLING YOURSELF?: NO
6. HAVE YOU EVER DONE ANYTHING, STARTED TO DO ANYTHING, OR PREPARED TO DO ANYTHING TO END YOUR LIFE?: NO
1. IN THE PAST MONTH, HAVE YOU WISHED YOU WERE DEAD OR WISHED YOU COULD GO TO SLEEP AND NOT WAKE UP?: NO

## 2024-11-18 NOTE — PROGRESS NOTES
Pt called today -   Notices sudden on set of heart racing -   Up to 150 -   Mild SOB -     Had cut the metoprolol down to 12.5 mg a day     Told him to take a metoprolol - but if HR stays in the 150 range or higher and he has SOB   - he has to call 911 to go to the hospital -   He voiced understanding

## 2024-11-19 ENCOUNTER — APPOINTMENT (OUTPATIENT)
Dept: CARDIOLOGY | Facility: HOSPITAL | Age: 64
End: 2024-11-19
Payer: MEDICARE

## 2024-11-19 VITALS
SYSTOLIC BLOOD PRESSURE: 130 MMHG | HEIGHT: 69 IN | WEIGHT: 195.55 LBS | TEMPERATURE: 98 F | RESPIRATION RATE: 16 BRPM | DIASTOLIC BLOOD PRESSURE: 90 MMHG | OXYGEN SATURATION: 93 % | HEART RATE: 83 BPM | BODY MASS INDEX: 28.96 KG/M2

## 2024-11-19 PROBLEM — I48.0 PAROXYSMAL ATRIAL FIBRILLATION (MULTI): Status: ACTIVE | Noted: 2024-11-19

## 2024-11-19 PROBLEM — I48.92 ATRIAL FIBRILLATION AND FLUTTER: Status: RESOLVED | Noted: 2024-11-19 | Resolved: 2024-11-19

## 2024-11-19 PROBLEM — I48.91 ATRIAL FIBRILLATION AND FLUTTER: Status: RESOLVED | Noted: 2024-11-19 | Resolved: 2024-11-19

## 2024-11-19 PROBLEM — G51.0 BELL'S PALSY: Status: ACTIVE | Noted: 2024-11-19

## 2024-11-19 PROBLEM — I48.92 ATRIAL FIBRILLATION AND FLUTTER: Status: ACTIVE | Noted: 2024-11-19

## 2024-11-19 PROBLEM — I48.91 ATRIAL FIBRILLATION AND FLUTTER: Status: ACTIVE | Noted: 2024-11-19

## 2024-11-19 LAB
ANION GAP SERPL CALC-SCNC: 12 MMOL/L (ref 10–20)
AORTIC VALVE MEAN GRADIENT: 6 MMHG
AORTIC VALVE PEAK VELOCITY: 1.58 M/S
ATRIAL RATE: 300 BPM
ATRIAL RATE: 300 BPM
ATRIAL RATE: 306 BPM
ATRIAL RATE: 64 BPM
ATRIAL RATE: 75 BPM
AV PEAK GRADIENT: 10 MMHG
AVA (PEAK VEL): 3.15 CM2
AVA (VTI): 2.39 CM2
BUN SERPL-MCNC: 15 MG/DL (ref 6–23)
CALCIUM SERPL-MCNC: 9 MG/DL (ref 8.6–10.3)
CARDIAC TROPONIN I PNL SERPL HS: 39 NG/L (ref 0–20)
CHLORIDE SERPL-SCNC: 102 MMOL/L (ref 98–107)
CO2 SERPL-SCNC: 27 MMOL/L (ref 21–32)
CREAT SERPL-MCNC: 1.08 MG/DL (ref 0.5–1.3)
EGFRCR SERPLBLD CKD-EPI 2021: 76 ML/MIN/1.73M*2
EJECTION FRACTION APICAL 4 CHAMBER: 53.7
EJECTION FRACTION: 58 %
ERYTHROCYTE [DISTWIDTH] IN BLOOD BY AUTOMATED COUNT: 13.2 % (ref 11.5–14.5)
GLUCOSE BLD MANUAL STRIP-MCNC: 107 MG/DL (ref 74–99)
GLUCOSE BLD MANUAL STRIP-MCNC: 168 MG/DL (ref 74–99)
GLUCOSE BLD MANUAL STRIP-MCNC: 212 MG/DL (ref 74–99)
GLUCOSE SERPL-MCNC: 159 MG/DL (ref 74–99)
HCT VFR BLD AUTO: 45.2 % (ref 41–52)
HGB BLD-MCNC: 15.6 G/DL (ref 13.5–17.5)
LEFT ATRIUM VOLUME AREA LENGTH INDEX BSA: 16.5 ML/M2
LEFT VENTRICULAR OUTFLOW TRACT DIAMETER: 2.19 CM
MCH RBC QN AUTO: 28.8 PG (ref 26–34)
MCHC RBC AUTO-ENTMCNC: 34.5 G/DL (ref 32–36)
MCV RBC AUTO: 84 FL (ref 80–100)
MITRAL VALVE E/A RATIO: 2.35
NRBC BLD-RTO: 0 /100 WBCS (ref 0–0)
P AXIS: 254 DEGREES
P AXIS: 32 DEGREES
P AXIS: 40 DEGREES
P AXIS: 57 DEGREES
P AXIS: 81 DEGREES
P OFFSET: 156 MS
P OFFSET: 161 MS
P OFFSET: 202 MS
P OFFSET: 205 MS
P OFFSET: 218 MS
P ONSET: 132 MS
P ONSET: 163 MS
P ONSET: 173 MS
P ONSET: 92 MS
P ONSET: 97 MS
PLATELET # BLD AUTO: 238 X10*3/UL (ref 150–450)
POTASSIUM SERPL-SCNC: 4.1 MMOL/L (ref 3.5–5.3)
PR INTERVAL: 190 MS
PR INTERVAL: 252 MS
Q ONSET: 223 MS
Q ONSET: 226 MS
Q ONSET: 226 MS
Q ONSET: 227 MS
Q ONSET: 228 MS
QRS COUNT: 10 BEATS
QRS COUNT: 12 BEATS
QRS COUNT: 15 BEATS
QRS COUNT: 22 BEATS
QRS COUNT: 25 BEATS
QRS DURATION: 70 MS
QRS DURATION: 74 MS
QRS DURATION: 82 MS
QT INTERVAL: 270 MS
QT INTERVAL: 314 MS
QT INTERVAL: 360 MS
QT INTERVAL: 382 MS
QT INTERVAL: 382 MS
QTC CALCULATION(BAZETT): 394 MS
QTC CALCULATION(BAZETT): 402 MS
QTC CALCULATION(BAZETT): 431 MS
QTC CALCULATION(BAZETT): 462 MS
QTC CALCULATION(BAZETT): 469 MS
QTC FREDERICIA: 369 MS
QTC FREDERICIA: 387 MS
QTC FREDERICIA: 390 MS
QTC FREDERICIA: 406 MS
QTC FREDERICIA: 439 MS
R AXIS: 12 DEGREES
R AXIS: 27 DEGREES
R AXIS: 5 DEGREES
R AXIS: 5 DEGREES
R AXIS: 7 DEGREES
RBC # BLD AUTO: 5.41 X10*6/UL (ref 4.5–5.9)
RIGHT VENTRICLE FREE WALL PEAK S': 10 CM/S
RIGHT VENTRICLE PEAK SYSTOLIC PRESSURE: 17.5 MMHG
SODIUM SERPL-SCNC: 137 MMOL/L (ref 136–145)
T AXIS: -32 DEGREES
T AXIS: 36 DEGREES
T AXIS: 44 DEGREES
T AXIS: 45 DEGREES
T AXIS: 9 DEGREES
T OFFSET: 363 MS
T OFFSET: 383 MS
T OFFSET: 407 MS
T OFFSET: 414 MS
T OFFSET: 417 MS
TRICUSPID ANNULAR PLANE SYSTOLIC EXCURSION: 1.4 CM
VENTRICULAR RATE: 130 BPM
VENTRICULAR RATE: 153 BPM
VENTRICULAR RATE: 64 BPM
VENTRICULAR RATE: 75 BPM
VENTRICULAR RATE: 91 BPM
WBC # BLD AUTO: 11.9 X10*3/UL (ref 4.4–11.3)

## 2024-11-19 PROCEDURE — 99222 1ST HOSP IP/OBS MODERATE 55: CPT | Performed by: STUDENT IN AN ORGANIZED HEALTH CARE EDUCATION/TRAINING PROGRAM

## 2024-11-19 PROCEDURE — 36415 COLL VENOUS BLD VENIPUNCTURE: CPT | Performed by: INTERNAL MEDICINE

## 2024-11-19 PROCEDURE — G0378 HOSPITAL OBSERVATION PER HR: HCPCS

## 2024-11-19 PROCEDURE — 93005 ELECTROCARDIOGRAM TRACING: CPT

## 2024-11-19 PROCEDURE — 93306 TTE W/DOPPLER COMPLETE: CPT | Performed by: STUDENT IN AN ORGANIZED HEALTH CARE EDUCATION/TRAINING PROGRAM

## 2024-11-19 PROCEDURE — 96376 TX/PRO/DX INJ SAME DRUG ADON: CPT | Mod: 59

## 2024-11-19 PROCEDURE — 2500000004 HC RX 250 GENERAL PHARMACY W/ HCPCS (ALT 636 FOR OP/ED): Performed by: NURSE PRACTITIONER

## 2024-11-19 PROCEDURE — 96365 THER/PROPH/DIAG IV INF INIT: CPT | Mod: 59

## 2024-11-19 PROCEDURE — 99291 CRITICAL CARE FIRST HOUR: CPT | Performed by: STUDENT IN AN ORGANIZED HEALTH CARE EDUCATION/TRAINING PROGRAM

## 2024-11-19 PROCEDURE — 99238 HOSP IP/OBS DSCHRG MGMT 30/<: CPT | Performed by: FAMILY MEDICINE

## 2024-11-19 PROCEDURE — 2500000002 HC RX 250 W HCPCS SELF ADMINISTERED DRUGS (ALT 637 FOR MEDICARE OP, ALT 636 FOR OP/ED): Performed by: INTERNAL MEDICINE

## 2024-11-19 PROCEDURE — 2500000001 HC RX 250 WO HCPCS SELF ADMINISTERED DRUGS (ALT 637 FOR MEDICARE OP): Performed by: INTERNAL MEDICINE

## 2024-11-19 PROCEDURE — 82947 ASSAY GLUCOSE BLOOD QUANT: CPT

## 2024-11-19 PROCEDURE — 84484 ASSAY OF TROPONIN QUANT: CPT | Performed by: INTERNAL MEDICINE

## 2024-11-19 PROCEDURE — 2500000004 HC RX 250 GENERAL PHARMACY W/ HCPCS (ALT 636 FOR OP/ED): Performed by: INTERNAL MEDICINE

## 2024-11-19 PROCEDURE — 80048 BASIC METABOLIC PNL TOTAL CA: CPT | Performed by: INTERNAL MEDICINE

## 2024-11-19 PROCEDURE — C8929 TTE W OR WO FOL WCON,DOPPLER: HCPCS

## 2024-11-19 PROCEDURE — 96375 TX/PRO/DX INJ NEW DRUG ADDON: CPT | Mod: 59

## 2024-11-19 PROCEDURE — 85027 COMPLETE CBC AUTOMATED: CPT | Performed by: INTERNAL MEDICINE

## 2024-11-19 RX ORDER — DEXTROSE 50 % IN WATER (D50W) INTRAVENOUS SYRINGE
12.5
Status: DISCONTINUED | OUTPATIENT
Start: 2024-11-19 | End: 2024-11-20 | Stop reason: HOSPADM

## 2024-11-19 RX ORDER — DIGOXIN 0.25 MG/ML
250 INJECTION INTRAMUSCULAR; INTRAVENOUS ONCE
Status: DISCONTINUED | OUTPATIENT
Start: 2024-11-19 | End: 2024-11-19

## 2024-11-19 RX ORDER — DIGOXIN 125 MCG
125 TABLET ORAL DAILY
Status: DISCONTINUED | OUTPATIENT
Start: 2024-11-20 | End: 2024-11-19

## 2024-11-19 RX ORDER — INSULIN LISPRO 100 [IU]/ML
0-10 INJECTION, SOLUTION INTRAVENOUS; SUBCUTANEOUS
Status: DISCONTINUED | OUTPATIENT
Start: 2024-11-19 | End: 2024-11-20 | Stop reason: HOSPADM

## 2024-11-19 RX ORDER — METOPROLOL TARTRATE 1 MG/ML
5 INJECTION, SOLUTION INTRAVENOUS ONCE
Status: COMPLETED | OUTPATIENT
Start: 2024-11-19 | End: 2024-11-19

## 2024-11-19 RX ORDER — METOPROLOL SUCCINATE 25 MG/1
25 TABLET, EXTENDED RELEASE ORAL
Qty: 60 TABLET | Refills: 0 | Status: SHIPPED | OUTPATIENT
Start: 2024-11-19 | End: 2024-12-19

## 2024-11-19 RX ORDER — DEXTROSE 50 % IN WATER (D50W) INTRAVENOUS SYRINGE
25
Status: DISCONTINUED | OUTPATIENT
Start: 2024-11-19 | End: 2024-11-20 | Stop reason: HOSPADM

## 2024-11-19 RX ORDER — DIGOXIN 0.25 MG/ML
250 INJECTION INTRAMUSCULAR; INTRAVENOUS ONCE
Status: COMPLETED | OUTPATIENT
Start: 2024-11-19 | End: 2024-11-19

## 2024-11-19 RX ORDER — DILTIAZEM HCL/D5W 125 MG/125
5-15 PLASTIC BAG, INJECTION (ML) INTRAVENOUS CONTINUOUS
Status: DISCONTINUED | OUTPATIENT
Start: 2024-11-19 | End: 2024-11-19

## 2024-11-19 ASSESSMENT — ENCOUNTER SYMPTOMS
DIAPHORESIS: 1
PSYCHIATRIC NEGATIVE: 1
NAUSEA: 1
EYES NEGATIVE: 1
MUSCULOSKELETAL NEGATIVE: 1
HEMATOLOGIC/LYMPHATIC NEGATIVE: 1
FATIGUE: 1
NEUROLOGICAL NEGATIVE: 1
ENDOCRINE NEGATIVE: 1
SHORTNESS OF BREATH: 1
ALLERGIC/IMMUNOLOGIC NEGATIVE: 1
PALPITATIONS: 1

## 2024-11-19 ASSESSMENT — COGNITIVE AND FUNCTIONAL STATUS - GENERAL
DAILY ACTIVITIY SCORE: 24
MOBILITY SCORE: 24

## 2024-11-19 ASSESSMENT — PAIN SCALES - GENERAL
PAINLEVEL_OUTOF10: 0 - NO PAIN
PAINLEVEL_OUTOF10: 0 - NO PAIN

## 2024-11-19 ASSESSMENT — ACTIVITIES OF DAILY LIVING (ADL): LACK_OF_TRANSPORTATION: NO

## 2024-11-19 ASSESSMENT — PAIN - FUNCTIONAL ASSESSMENT: PAIN_FUNCTIONAL_ASSESSMENT: 0-10

## 2024-11-19 NOTE — H&P
History Of Present Illness  Morris Aquino is a 64 y.o. male with history of hypertension, type 2 diabetes mellitus, paroxysmal atrial flutter, and Charcot My tooth disease presenting with shortness of breath and palpitations.  He says he developed shortness of breath while walking to the mailbox.  Concurrently, he felt palpitations so he used his home pulse ox machine to check his heart rate and got a reading of about 150/min (his pulse ox was 97%). He called his PCP and was told to take an extra dose of metoprolol which he did. After about 4 hours of getting no response to the beta blocker, he called EMS and was brought to the ED. He denied having chest pain or lightheadedness but he did admit to nausea, slight diaphoresis, and fatigue.  He is EKG on arrival in the ED showed atrial flutter with a 2-1 AV block with a ventricular rate of 153/min.  He was given 25 mg of IV diltiazem with his heart rate decreasing to about 75/min.     Past Medical History  Past Medical History:   Diagnosis Date    Bell's palsy     Bell's palsy    Other conditions influencing health status 03/10/2022    Diabetes type 2, uncontrolled    Personal history of other (healed) physical injury and trauma 12/06/2017    History of corneal abrasion    Personal history of other diseases of male genital organs 02/27/2013    History of epididymitis    Retention of urine, unspecified 02/27/2013    Incomplete emptying of bladder    Type 2 diabetes mellitus without complications (Multi) 12/09/2022    Controlled type 2 diabetes mellitus   Atrial flutter  Charcoaled My tooth disease  BPH  Chronic depression  Benign essential hypertension    Past Surgical History  Past Surgical History:   Procedure Laterality Date    CERVICAL LAMINECTOMY  07/22/2013    Laminectomy Cervical    OTHER SURGICAL HISTORY  09/10/2015    Dental Surgery    OTHER SURGICAL HISTORY  07/22/2013    Fasciotomy Of The Foot        Social History  He reports that he has never smoked.  He has never used smokeless tobacco. He reports that he does not drink alcohol and does not use drugs.    Family History  Family History   Problem Relation Name Age of Onset    Charcot-My-Tooth disease Mother      Breast cancer Mother           at age 37 of breast cancer    Other (Cardiac disorder) Father      Hypertension Father      Diabetes Father          Type II    Colon cancer Maternal Grandfather      Charcot-My-Tooth disease Sibling      Asthma Other      Diabetes Other      Hypertension Other          Allergies  Patient has no known allergies.    Medications  No current facility-administered medications on file prior to encounter.     Current Outpatient Medications on File Prior to Encounter   Medication Sig Dispense Refill    apixaban (Eliquis) 5 mg tablet Take 1 tablet (5 mg) by mouth 2 times a day. 180 tablet 3    atorvastatin (Lipitor) 80 mg tablet TAKE 1 TABLET AT BEDTIME 90 tablet 3    CAFFEINE ORAL Caffeine TABS   Refills: 0       Active      cetirizine (ZyrTEC) 10 mg tablet Take 1 tablet (10 mg) by mouth once daily.      cholecalciferol (Vitamin D3) 50 MCG (2000 UT) tablet Take 1 tablet (50 mcg) by mouth once daily.      levothyroxine (Synthroid, Levoxyl) 100 mcg tablet TAKE 1 TABLET DAILY 90 tablet 3    metFORMIN (Glucophage) 500 mg tablet TAKE 2 TABLETS TWICE A DAY WITH MORNING AND EVENING MEALS 360 tablet 3    metoprolol succinate XL (Toprol-XL) 25 mg 24 hr tablet Take 0.5 tablets (12.5 mg) by mouth twice a day. Do not crush or chew. (Patient taking differently: Take 1 tablet (25 mg) by mouth twice a day. Do not crush or chew.) 180 tablet 0    nabumetone (Relafen) 750 mg tablet TAKE 1 TABLET EVERY 12 HOURS 180 tablet 3    omeprazole (PriLOSEC) 20 mg DR capsule Take 1 capsule (20 mg) by mouth once daily.      pregabalin (Lyrica) 75 mg capsule TAKE 1 CAPSULE THREE TIMES A  capsule 1    tamsulosin (Flomax) 0.4 mg 24 hr capsule TAKE 1 CAPSULE ONCE DAILY 30 MINUTES FOLLOWING THE SAME  MEAL DAILY 90 capsule 3    traMADol (Ultram) 50 mg tablet Take 1 tablet (50 mg) by mouth every 6 hours if needed for severe pain (7 - 10). 360 tablet 0    Trintellix 20 mg tablet tablet Take 1 tablet (20 mg) by mouth once daily. 90 tablet 3    blood sugar diagnostic (Blood Glucose Test) strip In Vitro Strips. TEST ONCE DAILY AS DIRECTED DX E11.9      clobetasol (Temovate) 0.05 % cream APPLY SPARINGLY TO AFFECTED AREA(S) TWICE DAILY      FreeStyle Lite Strips strip TEST ONCE DAILY      LANCETS MISC 28G. Test once daily - DX E 11.9      [DISCONTINUED] aspirin 81 mg EC tablet Take 1 tablet (81 mg) by mouth once daily.      [DISCONTINUED] metoprolol succinate XL (Toprol-XL) 25 mg 24 hr tablet Take 0.5 tablets (12.5 mg) by mouth once daily at bedtime. Do not crush or chew. 45 tablet 3         Review of Systems   Constitutional:  Positive for diaphoresis and fatigue.   HENT: Negative.     Eyes: Negative.    Respiratory:  Positive for shortness of breath.    Cardiovascular:  Positive for palpitations.   Gastrointestinal:  Positive for nausea.   Endocrine: Negative.    Genitourinary: Negative.    Musculoskeletal: Negative.    Skin: Negative.    Allergic/Immunologic: Negative.    Neurological: Negative.    Hematological: Negative.    Psychiatric/Behavioral: Negative.          Physical Exam  Constitutional:       General: He is not in acute distress.     Appearance: Normal appearance. He is not ill-appearing, toxic-appearing or diaphoretic.   HENT:      Head: Normocephalic and atraumatic.      Right Ear: External ear normal.      Left Ear: External ear normal.      Nose: Nose normal.      Mouth/Throat:      Mouth: Mucous membranes are moist.      Pharynx: Oropharynx is clear. No oropharyngeal exudate or posterior oropharyngeal erythema.   Eyes:      General: No scleral icterus.        Right eye: No discharge.         Left eye: No discharge.      Conjunctiva/sclera: Conjunctivae normal.   Cardiovascular:      Heart sounds: No  "murmur heard.     Comments: S1 and S2 tachy and irregular  Pulmonary:      Breath sounds: No wheezing, rhonchi or rales.   Abdominal:      General: There is no distension.      Palpations: Abdomen is soft. There is no mass.      Tenderness: There is no abdominal tenderness. There is no right CVA tenderness, left CVA tenderness, guarding or rebound.   Musculoskeletal:      Cervical back: Neck supple.      Right lower leg: No edema.      Left lower leg: No edema.      Comments: BLE weakness with BLE calf muscle atrophy. BLE foot drop   Lymphadenopathy:      Cervical: No cervical adenopathy.   Skin:     General: Skin is warm and dry.      Findings: No lesion or rash.   Neurological:      General: No focal deficit present.      Mental Status: He is alert and oriented to person, place, and time.   Psychiatric:         Mood and Affect: Mood normal.         Behavior: Behavior normal.          Last Recorded Vitals  Blood pressure 111/67, pulse 78, temperature 36.5 °C (97.7 °F), resp. rate 16, height 1.753 m (5' 9\"), weight 90.7 kg (200 lb), SpO2 96%.    Relevant Results   Latest Reference Range & Units 11/18/24 19:58   GLUCOSE 74 - 99 mg/dL 147 (H)   SODIUM 136 - 145 mmol/L 135 (L)   POTASSIUM 3.5 - 5.3 mmol/L 4.7   CHLORIDE 98 - 107 mmol/L 98   Bicarbonate 21 - 32 mmol/L 23   Anion Gap 10 - 20 mmol/L 19   Blood Urea Nitrogen 6 - 23 mg/dL 14   Creatinine 0.50 - 1.30 mg/dL 1.08   EGFR >60 mL/min/1.73m*2 76   Calcium 8.6 - 10.3 mg/dL 9.0   PHOSPHORUS 2.5 - 4.9 mg/dL 3.0   Albumin 3.4 - 5.0 g/dL 4.1   Alkaline Phosphatase 33 - 136 U/L 76   ALT 10 - 52 U/L 26   AST 9 - 39 U/L 21   Bilirubin Total 0.0 - 1.2 mg/dL 0.7   Total Protein 6.4 - 8.2 g/dL 6.9   MAGNESIUM 1.60 - 2.40 mg/dL 1.64   BNP 0 - 99 pg/mL 258 (H)   Troponin I, High Sensitivity 0 - 20 ng/L 21 (H)   Thyroid Stimulating Hormone 0.44 - 3.98 mIU/L 6.44 (H)   (H): Data is abnormally high  (L): Data is abnormally low    XR CHEST:     IMPRESSION:  1.  No evidence of " acute cardiopulmonary process.     Assessment/Plan   Paroxysmal atrial flutter with RVR  Observation to telemetry  Will use IV metoprolol and digoxin for rate control  Increase dose of oral metoprolol from 12.5 to 25 mg   On long term anticoagulation with Apixaban  Check serial cardiac markers and EKGs  Cardiology consulted.     Type II DM  Resume home oral glucose lowering medication  Accu checks and will add ISS coverage as needed    Charcot Ym Tooth disease  Pt with significant LE neuropathy  Pregabalin  Supportive care    Hyperlipidemia  Atorvastatin    Hypothyroidism  TSH 6.44  Resume levothyroxine  Will not increase dose of levothyroxine at this time because of atrial flutter with RVR    BPH  Clinically stable  On Tamsulosin      I spent 75 minutes in the professional and overall care of this patient.      Chin Tinajero MD

## 2024-11-19 NOTE — PROGRESS NOTES
"Morris Aquino is a 64 y.o. male on day 0 of admission presenting with Atrial fib/flutter, transient (Multi).    Subjective   Patient patient is complaining being tired for the past couple of days but declined chest pain, cough, shortness of breath, nausea, vomiting, heart palpitation.        Objective   Normal vital sign.    Physical Exam  Constitutional:       General: He is not in acute distress.     Appearance: Normal appearance. He is obese. He is not ill-appearing, toxic-appearing or diaphoretic.   Cardiovascular:      Rate and Rhythm: Normal rate and regular rhythm.      Pulses: Normal pulses.      Heart sounds: Normal heart sounds.   Pulmonary:      Effort: Pulmonary effort is normal. No respiratory distress.      Breath sounds: Normal breath sounds. No stridor. No wheezing or rales.   Chest:      Chest wall: No tenderness.   Abdominal:      General: Bowel sounds are normal. There is no distension.      Palpations: Abdomen is soft. There is no mass.      Tenderness: There is no abdominal tenderness. There is no guarding or rebound.      Hernia: No hernia is present.   Skin:     General: Skin is warm and dry.   Neurological:      Mental Status: He is alert and oriented to person, place, and time.      Sensory: Sensory deficit present.      Motor: Weakness present.      Coordination: Coordination abnormal.      Gait: Gait abnormal.   Psychiatric:         Mood and Affect: Mood normal.         Behavior: Behavior normal.       Last Recorded Vitals  Blood pressure 124/80, pulse 76, temperature 36.8 °C (98.3 °F), temperature source Tympanic, resp. rate 17, height 1.753 m (5' 9.02\"), weight 88.7 kg (195 lb 8.8 oz), SpO2 95%.  Intake/Output last 3 Shifts:  No intake/output data recorded.    Relevant Results                 Assessment/Plan   He is 64-year-old man with past medical history of hypertension, diabetes mellitus type 2, paroxysmal A-fib, Charcot-My-Tooth presents with shortness of breath and palpitation. "   His heart rate was controlled with Diltiazem in the ED    Paroxysmal A-fib  -Patient is in sinus rhythm  -Continue with apixaban 5 mg twice daily  -Continue with atorvastatin 80 mg tablet,   -Cardiology team is on board which recommend CPAP for BRAXTON and EP to reevaluate for atrial flutter.   -Pending TTE  -Slightly trending up troponin 39    Type 2 diabetes mellitus  -Insulin sliding scale    Hypothyroidism  TSH 6.44. T4 1.17  -Continue with levothyroxine    Charcot-My-Tooth  Patient is on pregabalin 75 mg, TID    BPH  Patient is on tamsulosin.    Diet-cardiac diet  DVT-apixaban  Fluid-PO  Electrolyte- replenish as needed    Dispo; is based on the cardiac team.     Viridiana Telles MD  Family Medicine resident  PGY3

## 2024-11-19 NOTE — CONSULTS
Inpatient consult to Cardiology  Consult performed by: GINA Parrish-CNP  Consult ordered by: Chin Tinajero MD        History Of Present Illness:    Morris Aquino is a 64 y.o. male with PMH of HTN, DM2, PAF and atrial flutter (eliquis) presenting with shortness of breath and palpitations. He checked his HR on pulse ox and noticed  bpm. He called his PCP and was told to take an extra dose of metoprolol which he did. After about 4 hours of getting no response to the beta blocker, he called EMS and was brought to the ED. He denied having chest pain or lightheadedness. He is EKG on arrival in the ED showed atrial flutter with a 2-1 AV block with a ventricular rate of 153/min.  He was given 25 mg of IV diltiazem with his heart rate decreasing to about 75/min.     Follows with Dr. Mendosa. Last OV in 6/2024. Plan was for atrial flutter ablation.     Review of Systems   Respiratory:  Positive for shortness of breath.    Cardiovascular:  Positive for palpitations.   All other systems reviewed and are negative.         Last Recorded Vitals:  Vitals:    11/19/24 0615 11/19/24 0630 11/19/24 0652 11/19/24 0700   BP: 124/81 118/79  118/78   BP Location:       Patient Position:       Pulse: 64 63 58 59   Resp: 10 11  13   Temp:       TempSrc:       SpO2:       Weight:       Height:           Last Labs:  CBC - 11/19/2024:  5:28 AM  11.9 15.6 238    45.2      CMP - 11/19/2024:  5:28 AM  9.0 6.9 21 --- 0.7   3.0 4.1 26 76      PTT - No results in last year.  1.2   12.1 _     Troponin I, High Sensitivity   Date/Time Value Ref Range Status   11/19/2024 05:28 AM 39 (H) 0 - 20 ng/L Final   11/18/2024 09:04 PM 23 (H) 0 - 20 ng/L Final   11/18/2024 07:58 PM 21 (H) 0 - 20 ng/L Final     BNP   Date/Time Value Ref Range Status   11/18/2024 07:58  (H) 0 - 99 pg/mL Final     POC HEMOGLOBIN A1c   Date/Time Value Ref Range Status   09/27/2024 02:31 PM 6.2 4.2 - 6.5 % Final   03/26/2024 03:00 PM 6.7 (A) 4.2 - 6.5 %  Final     Hemoglobin A1C   Date/Time Value Ref Range Status   06/27/2024 02:27 PM 6.8 (H) see below % Final     LDL Calculated   Date/Time Value Ref Range Status   06/27/2024 02:27 PM 66 <=99 mg/dL Final     Comment:                                 Near   Borderline      AGE      Desirable  Optimal    High     High     Very High     0-19 Y     0 - 109     ---    110-129   >/= 130     ----    20-24 Y     0 - 119     ---    120-159   >/= 160     ----      >24 Y     0 -  99   100-129  130-159   160-189     >/=190       VLDL   Date/Time Value Ref Range Status   06/27/2024 02:27 PM 33 0 - 40 mg/dL Final   06/29/2023 03:59 PM 19 0 - 40 mg/dL Final   06/13/2022 01:14 PM 19 0 - 40 mg/dL Final   06/01/2021 01:15 PM 31 0 - 40 mg/dL Final      Last I/O:  No intake/output data recorded.    Past Cardiology Tests (Last 3 Years):  EKG:  Electrocardiogram  See HPI    Echo:  Transthoracic echo (TTE) complete 05/21/2024  [facility], [address] [place] [address]                Tel [phone] and Fax [phone]     TRANSTHORACIC ECHOCARDIOGRAM REPORT        Patient Name:      LEO Manzo Physician:    [id number] Nicolás Orona MD  Study Date:        5/21/2024            Ordering Provider:    [id number] TAYLOR CARRILLO  MRN/PID:           [id number]             Fellow:  Accession#:        [id number]         Nurse:  Date of Birth/Age: 1960 / 64 years Sonographer:          Leonora Nicholas RDCS  Gender:            M                    Additional Staff:  Height:            175.26 cm            Admit Date:  Weight:            92.99 kg             Admission Status:     Outpatient  BSA / BMI:         2.09 m2 / 30.27      Encounter#:           [id number]                     kg/m2                                           Department Location:  [facility] Non                                                                Invasive  Blood Pressure: 141 /88 mmHg     Study Type:    TRANSTHORACIC ECHO (TTE) COMPLETE  Diagnosis/ICD: Typical atrial flutter-I48.3  Indication:    Atrial Flutter  CPT Code:      Echo Complete w Full [id number]     Patient History:  Pertinent History: Charcot-My-Tooth disease, DM, BRAXTON on CPAP.     Study Detail: The following Echo studies were performed: 2D, M-Mode, Doppler and                color flow. Technically challenging study due to prominent lung                artifact. Unable to obtain subcostal view. The patient was awake.        PHYSICIAN INTERPRETATION:  Left Ventricle: The left ventricular systolic function is normal, with an estimated ejection fraction of 55-60%. There are no regional wall motion abnormalities. The left ventricular cavity size is normal. Spectral Doppler shows a normal pattern of left ventricular diastolic filling.  Left Atrium: The left atrium is normal in size.  Right Ventricle: The right ventricle is normal in size. There is normal right ventricular global systolic function.  Right Atrium: The right atrium is normal in size.  Aortic Valve: The aortic valve appears structurally normal. There is no evidence of aortic valve regurgitation. The peak instantaneous gradient of the aortic valve is 6.6 mmHg. The mean gradient of the aortic valve is 3.9 mmHg.  Mitral Valve: The mitral valve is normal in structure. There is trace to mild mitral valve regurgitation.  Tricuspid Valve: The tricuspid valve is structurally normal. There is trace to mild tricuspid regurgitation.  Pulmonic Valve: The pulmonic valve is structurally normal. There is no indication of pulmonic valve regurgitation.  Pericardium: There is no pericardial effusion noted.  Aorta: The aortic root is normal.  Pulmonary Artery: The tricuspid regurgitant velocity is 2.15 m/s, and with an estimated right atrial  "pressure of 10 mmHg, the estimated pulmonary artery pressure is normal with the RVSP at 28.5 mmHg.  Systemic Veins: The inferior vena cava was not well visualized.        CONCLUSIONS:   1. Left ventricular systolic function is normal with a 55-60% estimated ejection fraction.   2. There is trace-mild mitral and tricuspid regurgitation.    Ejection Fractions:  No results found for: \"EF\"  Cath:  No results found for this or any previous visit from the past 1095 days.    Stress Test:  No results found for this or any previous visit from the past 1095 days.    Cardiac Imaging:  No results found for this or any previous visit from the past 1095 days.      Past Medical History:  He has a past medical history of Bell's palsy, Other conditions influencing health status (03/10/2022), Personal history of other (healed) physical injury and trauma (2017), Personal history of other diseases of male genital organs (2013), Retention of urine, unspecified (2013), and Type 2 diabetes mellitus without complications (Multi) (2022).    Past Surgical History:  He has a past surgical history that includes Other surgical history (09/10/2015); Cervical laminectomy (2013); and Other surgical history (2013).      Social History:  He reports that he has never smoked. He has never used smokeless tobacco. He reports that he does not drink alcohol and does not use drugs.    Family History:  Family History   Problem Relation Name Age of Onset    Charcot-My-Tooth disease Mother      Breast cancer Mother           at age 37 of breast cancer    Other (Cardiac disorder) Father      Hypertension Father      Diabetes Father          Type II    Colon cancer Maternal Grandfather      Charcot-My-Tooth disease Sibling      Asthma Other      Diabetes Other      Hypertension Other          Allergies:  Patient has no known allergies.    Inpatient Medications:  Scheduled medications   Medication Dose Route " Frequency    apixaban  5 mg oral BID    aspirin  81 mg oral Daily    atorvastatin  80 mg oral Nightly    cetirizine  10 mg oral Daily    cholecalciferol  2,000 Units oral Daily    digoxin  250 mcg intravenous Once    [START ON 11/20/2024] digoxin  125 mcg oral Daily    docusate sodium  100 mg oral BID    insulin lispro  0-10 Units subcutaneous Before meals & nightly    levothyroxine  100 mcg oral Daily    metFORMIN  1,000 mg oral BID    metoprolol succinate XL  25 mg oral Daily    pantoprazole  40 mg oral Daily before breakfast    pregabalin  75 mg oral TID    tamsulosin  0.4 mg oral Daily    vortioxetine  20 mg oral Daily     PRN medications   Medication    acetaminophen    Or    acetaminophen    Or    acetaminophen    alum-mag hydroxide-simeth    calcium carbonate    dextromethorphan-guaifenesin    dextrose    dextrose    glucagon    glucagon    guaiFENesin    ondansetron    Or    ondansetron    traMADol     Continuous Medications   Medication Dose Last Rate    dilTIAZem  5-15 mg/hr Stopped (11/19/24 0649)     Outpatient Medications:  Current Outpatient Medications   Medication Instructions    apixaban (ELIQUIS) 5 mg, oral, 2 times daily    atorvastatin (LIPITOR) 80 mg, oral, Nightly    blood sugar diagnostic (Blood Glucose Test) strip In Vitro Strips. TEST ONCE DAILY AS DIRECTED DX E11.9    CAFFEINE ORAL Caffeine TABS   Refills: 0       Active    cetirizine (ZyrTEC) 10 mg tablet 1 tablet, Daily    cholecalciferol (VITAMIN D3) 50 mcg, oral, Daily    clobetasol (Temovate) 0.05 % cream APPLY SPARINGLY TO AFFECTED AREA(S) TWICE DAILY    FreeStyle Lite Strips strip TEST ONCE DAILY    LANCETS MISC 28G. Test once daily - DX E 11.9    levothyroxine (SYNTHROID, LEVOXYL) 100 mcg, oral, Daily    metFORMIN (Glucophage) 500 mg tablet TAKE 2 TABLETS TWICE A DAY WITH MORNING AND EVENING MEALS    metoprolol succinate XL (TOPROL-XL) 12.5 mg, oral, Twice a day (morning and mid-day), Do not crush or chew.    nabumetone (RELAFEN)  750 mg, oral, Every 12 hours    omeprazole (PriLOSEC) 20 mg DR capsule 1 capsule, Daily    pregabalin (Lyrica) 75 mg capsule TAKE 1 CAPSULE THREE TIMES A DAY    tamsulosin (Flomax) 0.4 mg 24 hr capsule TAKE 1 CAPSULE ONCE DAILY 30 MINUTES FOLLOWING THE SAME MEAL DAILY    traMADol (ULTRAM) 50 mg, oral, Every 6 hours PRN    Trintellix 20 mg, oral, Daily     Physical Exam  Vitals reviewed.   Constitutional:       Appearance: Normal appearance. He is obese.   HENT:      Head: Normocephalic and atraumatic.   Neck:      Vascular: No carotid bruit or JVD.   Cardiovascular:      Rate and Rhythm: Normal rate and regular rhythm.      Pulses: Normal pulses.      Heart sounds: Normal heart sounds.   Pulmonary:      Effort: Pulmonary effort is normal.      Breath sounds: Normal breath sounds.   Chest:      Chest wall: No tenderness.   Abdominal:      General: Abdomen is flat. Bowel sounds are normal.      Palpations: Abdomen is soft.   Skin:     General: Skin is warm and dry.   Neurological:      General: No focal deficit present.      Mental Status: He is alert and oriented to person, place, and time. Mental status is at baseline.   Psychiatric:         Mood and Affect: Mood normal.         Behavior: Behavior normal.       [id number] Score  Age 65-74: 1   Sex Male: 0   CHF History No: 0   HTN Yes: 1   Stroke/TIA/Thromboembolism No: 0   Vascular Dz: CAD/PAD/Aortic Plaque No: 0   DM Yes: 1   Total Score 3      Assessment/Plan   Assessment  64 y.o. male with PMH of HTN, DM2, PAF (eliquis), BRAXTON presenting with shortness of breath and palpitations. He is EKG on arrival in the ED showed 2:1 atrial flutter with a ventricular rate of 153/min. He was given 25 mg of IV diltiazem with his heart rate decreasing to about 75/min. Converted to NSR around 0530 this morning. Remains in NSR. Follows with Dr. Mendosa. Last OV in 6/2024. Plan was for atrial flutter ablation but patient did not proceed.      Paroxysmal atrial flutter      Plan  -TTE  -EKG  -Continue Eliquis 5 mg BID  -Continue Toprol 25 mg daily, atorvastatin 80 mg daily  -Recommend use of CPAP device for BRAXTON  -OP follow up with EP to reevaluate atrial flutter ablation    Peripheral IV 11/18/24 20 G Left Antecubital (Active)   Site Assessment Clean;Dry;Intact 11/18/24 2300   Dressing Status Clean;Dry 11/18/24 2300   Number of days: 1       Code Status:  Full Code    I spent  minutes in the professional and overall care of this patient.        Juanito Melissa, APRN-CNP

## 2024-11-19 NOTE — DISCHARGE SUMMARY
Discharge Diagnosis  Paroxysmal atrial fibrillation (Multi), hypothyroidism, Charcot Cari tooth, BPH    Issues Requiring Follow-Up  Follow-up with EP to reevaluate atrial flutter ablation/A-fib  Follow-up with PCP for medication refills if needed.     Discharge Meds     Medication List      CONTINUE taking these medications     apixaban 5 mg tablet; Commonly known as: Eliquis; Take 1 tablet (5 mg)   by mouth 2 times a day.   atorvastatin 80 mg tablet; Commonly known as: Lipitor; TAKE 1 TABLET AT   BEDTIME   * Blood Glucose Test strip; Generic drug: blood sugar diagnostic   * FreeStyle Lite Strips strip; Generic drug: blood sugar diagnostic   cetirizine 10 mg tablet; Commonly known as: ZyrTEC   cholecalciferol 50 MCG (2000 UT) tablet; Commonly known as: Vitamin D3;   Take 1 tablet (50 mcg) by mouth once daily.   clobetasol 0.05 % cream; Commonly known as: Temovate   LANCETS MISC   levothyroxine 100 mcg tablet; Commonly known as: Synthroid, Levoxyl;   TAKE 1 TABLET DAILY   metFORMIN 500 mg tablet; Commonly known as: Glucophage; TAKE 2 TABLETS   TWICE A DAY WITH MORNING AND EVENING MEALS   metoprolol succinate XL 25 mg 24 hr tablet; Commonly known as:   Toprol-XL; Take 1 tablet (25 mg) by mouth twice a day. Do not crush or   chew.   nabumetone 750 mg tablet; Commonly known as: Relafen; TAKE 1 TABLET   EVERY 12 HOURS   omeprazole 20 mg DR capsule; Commonly known as: PriLOSEC   pregabalin 75 mg capsule; Commonly known as: Lyrica; TAKE 1 CAPSULE   THREE TIMES A DAY   tamsulosin 0.4 mg 24 hr capsule; Commonly known as: Flomax; TAKE 1   CAPSULE ONCE DAILY 30 MINUTES FOLLOWING THE SAME MEAL DAILY   traMADol 50 mg tablet; Commonly known as: Ultram; Take 1 tablet (50 mg)   by mouth every 6 hours if needed for severe pain (7 - 10).   Trintellix 20 mg tablet tablet; Generic drug: vortioxetine; Take 1   tablet (20 mg) by mouth once daily.  * This list has 2 medication(s) that are the same as other medications   prescribed for  you. Read the directions carefully, and ask your doctor or   other care provider to review them with you.     STOP taking these medications     CAFFEINE ORAL       Test Results Pending At Discharge  Pending Labs       No current pending labs.            Hospital Course   He is 64 years old man with past medical history of hypertension, type 2 diabetes mellitus, paroxysmal A-fib, Charcot's My tooth who presented to [facility] for shortness of breath and palpitation.  In the ED patient had diltiazem with good control of rate.   During hospitalization patient was seen by cardiology team who did a workup with TTE, which was normal.   Cardiology recommended follow-up with electrophysiology for ablation A-fib, flutter.   He also recommended continue Eliquis 5 gram twice daily, metoprolol 25 mg daily, statin 80 mg daily.  Patient was advised to use CPAP device.   Patient is stable to go home.       Pertinent Physical Exam At Time of Discharge  Physical Exam  Constitutional:       General: He is not in acute distress.     Appearance: Normal appearance. He is obese. He is not ill-appearing, toxic-appearing or diaphoretic.   HENT:      Head: Normocephalic and atraumatic.   Cardiovascular:      Rate and Rhythm: Normal rate and regular rhythm.      Pulses: Normal pulses.      Heart sounds: Normal heart sounds.   Pulmonary:      Effort: Pulmonary effort is normal. No respiratory distress.      Breath sounds: Normal breath sounds. No stridor. No wheezing, rhonchi or rales.   Chest:      Chest wall: No tenderness.   Abdominal:      General: Abdomen is flat. Bowel sounds are normal. There is no distension.      Palpations: Abdomen is soft. There is no mass.      Tenderness: There is no abdominal tenderness. There is no guarding or rebound.      Hernia: No hernia is present.   Skin:     General: Skin is warm and dry.   Neurological:      Mental Status: He is alert and oriented to person, place, and time. Mental status  is at baseline.      Cranial Nerves: No cranial nerve deficit.      Sensory: Sensory deficit present.      Motor: Weakness present.      Coordination: Coordination abnormal.      Gait: Gait abnormal.   Psychiatric:         Mood and Affect: Mood normal.         Behavior: Behavior normal.         Outpatient Follow-Up  Future Appointments   Date Time Provider Department [place]   12/19/2024 11:30 AM Roxi Keen APRN-CNP [id number] [facility]   12/30/2024  1:00 PM Ct Win MD [id number] [facility]         Viridiana Telles MD  Family medicine resident  PGY3    I saw and evaluated the patient. I personally obtained the key and critical portions of the history and physical exam or was physically present for key and critical portions performed by the resident. I reviewed the resident's documentation and discussed the patient with the resident. I agree with the resident's medical decision making as documented in the note.

## 2024-11-19 NOTE — ED PROCEDURE NOTE
Procedure  Critical Care    Performed by: Luis Cramer DO  Authorized by: Luis Cramer DO    Critical care provider statement:     Critical care time (minutes):  31    Critical care time was exclusive of:  Separately billable procedures and treating other patients and teaching time    Critical care was necessary to treat or prevent imminent or life-threatening deterioration of the following conditions: Cardiac arrhythmia.    Critical care was time spent personally by me on the following activities:  Ordering and performing treatments and interventions, ordering and review of laboratory studies, ordering and review of radiographic studies, pulse oximetry, re-evaluation of patient's condition, review of old charts, examination of patient, evaluation of patient's response to treatment, development of treatment plan with patient or surrogate and obtaining history from patient or surrogate    Care discussed with: admitting provider                 Luis Cramer DO  11/19/24 0209

## 2024-11-19 NOTE — PROGRESS NOTES
Pt called office  -   Was walking out to get mail -   HR in the 150's and mildly SOB    At last appt - I had decreased the Metoprolol ER from 12.5 mg BID to hs only as he was getting dizzy  -     Told him to take a metoprolol -   If not improving - call 911 -   He voiced understating

## 2024-11-19 NOTE — ED TRIAGE NOTES
Patient presents to the ED via EMS with c/o a rapid HR since 230 pm.  He does have aflutter.  He is asymptomatic at this time with no other complaints.

## 2024-11-19 NOTE — PROGRESS NOTES
11/19/24 1019   Discharge Planning   Living Arrangements Alone   Support Systems None   Assistance Needed Patient is A&OX3, independent in ADLs, ambulates with a cane, drives, no O2 but has a CPAP machine, on Eliquis at home, no active HC agency.   Type of Residence Private residence   Number of Stairs to Enter Residence 3   Number of Stairs Within Residence 0   Do you have animals or pets at home? Yes   Type of Animals or Pets 4 cats   Who is requesting discharge planning? Provider   Home or Post Acute Services None   Expected Discharge Disposition Home  (Home, no needs-denied need for HHC, will need transport set up to home at discharge)   Does the patient need discharge transport arranged? Yes   RoundTrip coordination needed? Yes   Has discharge transport been arranged? No   Financial Resource Strain   How hard is it for you to pay for the very basics like food, housing, medical care, and heating? Not very   Housing Stability   In the last 12 months, was there a time when you were not able to pay the mortgage or rent on time? N   In the past 12 months, how many times have you moved where you were living? 0   At any time in the past 12 months, were you homeless or living in a shelter (including now)? N   Transportation Needs   In the past 12 months, has lack of transportation kept you from medical appointments or from getting medications? no   In the past 12 months, has lack of transportation kept you from meetings, work, or from getting things needed for daily living? No

## 2024-11-19 NOTE — ED PROVIDER NOTES
CC: Rapid Heart Rate     History provided by: Patient  Limitations to History: None    HPI:  Patient is a 64-year-old male with history of hypertension, depression, Charcot My disease, diabetes, hypothyroidism who presents with heart racing.  Patient states earlier today he noticed his heart was racing and called his PCP he took 12.5 mg of metoprolol which he usually takes at 3 PM and then was advised to take another 12.5 mg at 4 PM which did not help.  He states he was short of breath when walking which improved however his heart was still racing so he came into the ED after talking to his PCP.  He is taking his Eliquis daily.  He denies any chest pain, shortness of breath during my evaluation.  He denies any recent illnesses, fevers, chills, abdominal pain, nausea, vomiting.    I reviewed PCP note from earlier today where patient called due to sudden onset of heart racing up to 150 bpm and shortness of breath, patient had recently cut down to metoprolol 12.5mg daily and patient advised to come to ED. I also reviewed PCP note from 9/2024 when patient was seen for follow up.  Reviewed cardiology note from April 2024 when patient was referred for atrial flutter and he is on Eliquis and metoprolol. TTE from May 2024 reviewed and patient had LVEF of 55 to 60%, trace mitral and tricuspid regurgitation.      External Records Reviewed:  I reviewed prior ED visits, Care Everywhere, discharge summaries and outpatient records as appropriate.   ???????????????????????????????????????????????????????????????  Triage Vitals:  T 36.5 °C (97.7 °F)  HR (!) 149  BP (!) 118/98  RR 16  O2 94 %      Physical Exam  Vitals and nursing note reviewed.   Constitutional:       General: He is not in acute distress.     Appearance: Normal appearance.   HENT:      Head: Normocephalic and atraumatic.   Eyes:      Conjunctiva/sclera: Conjunctivae normal.   Cardiovascular:      Rate and Rhythm: Tachycardia present. Rhythm irregular.    Pulmonary:      Effort: Pulmonary effort is normal. No respiratory distress.      Breath sounds: Normal breath sounds.   Abdominal:      General: Abdomen is flat. There is no distension.      Palpations: Abdomen is soft.      Tenderness: There is no abdominal tenderness. There is no guarding or rebound.   Musculoskeletal:         General: No swelling or tenderness. Normal range of motion.      Cervical back: Normal range of motion and neck supple.   Skin:     General: Skin is warm and dry.   Neurological:      General: No focal deficit present.      Mental Status: He is alert and oriented to person, place, and time. Mental status is at baseline.   Psychiatric:         Mood and Affect: Mood normal.         Behavior: Behavior normal.        ???????????????????????????????????????????????????????????????  ED Course/Treatment/Medical Decision Making  MDM:  Patient is a 64-year-old male who presents with abnormal heart rhythm.  Vital signs notable for heart rate 150s, normal blood pressure, otherwise hemodynamically stable.  Differential diagnoses considered include paroxysmal versus persistent atrial fibrillation, thyroid disease, electrolyte derangement, ACS, infection.  Patient denies any cough, urinary symptoms, abdominal pain, nausea vomiting, low concern for acute infectious etiology.  Patient has documented history of atrial flutter and is on metoprolol at home which he did take, he is already on Eliquis.  Given patient trialed metoprolol at home prior to arrival and does not have known history of heart failure, does not appear fluid overloaded clinically I did order diltiazem for symptomatic atrial flutter.  Cardiac workup initiated.      ED Course:  ED Course as of 11/18/24 2110 Mon Nov 18, 2024 1940 EKG reviewed atrial flutter with 2-1 conduction pattern rate 153, QRS 70 ms, QTc 431 ms, no acute ST segment elevations or depressions, normal axis [SA]   2002 CXR reviewed without acute cardiopulmonary  abnormality [SA]   2013 After Cardizem patient's heart rate improved to 70-80 with normal blood pressure, he does endorse fatigue at this time, I discussed admission for symptomatic persistent atrial flutter and he is agreeable [SA]   2053 TSH elevated at 6.44, patient has known history of hypothyroidism, reflex T4 is pending, initial troponin 21, ,  CMP overall wnl, completion of lab work and delta troponin pending.  I did discuss admission with hospitalist. [SA]   2110 Repeat EKG with atrial flutter rate 75, no ST segment elevations or depressions [SA]      ED Course User Index  [SA] Prudence Cortez DO         Diagnoses as of 11/18/24 2110   Atrial fibrillation and flutter         EKG Interpretation:  See ED Course/Below:    Independent Interpretation of Studies:  I independently interpreted labs/imaging as stated in ED Course or below.    Differential diagnoses considered include but are not limited to: See MDM/Below:    Social Determinants Limiting Care:  None identified The following actions were taken to address these social determinants:      Discussion of Management with Other Providers: See MDM/Below:    Disposition:  Admitted    Prudence Cortez, EM, PGY-3    I reviewed the case with the attending ED physician. The attending ED physician agrees with the plan. Patient and/or patient´s representative was counseled regarding labs, imaging, likely diagnosis, and plan. All questions were answered.    Disclaimer: This note was dictated by speech recognition.  Attempt at proofreading was made to minimize errors.  Errors in transcription may be present.  Please call if questions.    Procedures ? SmartLinks last updated 11/18/2024 9:10 PM          Prudence Cortez DO  Resident  11/18/24 2056       Prudence Cortez DO  Resident  11/18/24 2110

## 2024-11-20 ENCOUNTER — PATIENT OUTREACH (OUTPATIENT)
Dept: PRIMARY CARE | Facility: CLINIC | Age: 64
End: 2024-11-20
Payer: MEDICARE

## 2024-11-20 NOTE — PROGRESS NOTES
Discharge Facility: [facility], OBS  Discharge Diagnosis: Paroxysmal atrial fibrillation, Hypothyroidism, Charcot-My-Tooth, BPH   Admission Date: 11/18/2024  Discharge Date: 11/19/2024    PCP Appointment Date: Message to office requesting hospital follow up  Specialist Appointment Date: Cardiac Electrophysiology referral,   12/19/2024 11:30 AM Roxi Keen CNP Cardiology  Hospital Encounter and Summary Linked: Yes  See discharge assessment below for further details    Medications  Medications reviewed with patient/caregiver?: Yes (Patient) (11/20/2024  9:43 AM)  Is the patient having any side effects they believe may be caused by any medication additions or changes?: No (11/20/2024  9:43 AM)  Does the patient have all medications ordered at discharge?: Yes (11/20/2024  9:43 AM)  Care Management Interventions: No intervention needed (11/20/2024  9:43 AM)  Prescription Comments: CHANGED: metoprolol succinate increased to 25 mg twice daily (11/20/2024  9:43 AM)  Is the patient taking all medications as directed (includes completed medication regime)?: Yes (11/20/2024  9:43 AM)  Care Management Interventions: Provided patient education (11/20/2024  9:43 AM)  Medication Comments: Verbalized understanding of medication changes. (11/20/2024  9:43 AM)    Appointments  Does the patient have a primary care provider?: Yes (11/20/2024  9:43 AM)  Care Management Interventions: -- (Message sent to office to schedule.) (11/20/2024  9:43 AM)  Has the patient kept scheduled appointments due by today?: Not applicable (11/20/2024  9:43 AM)  Care Management Interventions: Advised to schedule with specialist (Cardiac EP referral made.) (11/20/2024  9:43 AM)    Self Management  What is the home health agency?: N/A (11/20/2024  9:43 AM)  What Durable Medical Equipment (DME) was ordered?: N/A (11/20/2024  9:43 AM)    Patient Teaching  Does the patient have access to their discharge instructions?: Yes (11/20/2024  9:43  AM)  Care Management Interventions: Reviewed instructions with patient (11/20/2024  9:43 AM)  What is the patient's perception of their health status since discharge?: New symptoms unrelated to diagnosis (Denied heart palpitations. Patient c/o frequent cough. Also concerned because blood sugars were elevated in the hospital.) (11/20/2024  9:43 AM)  Is the patient/caregiver able to teach back the hierarchy of who to call/visit for symptoms/problems? PCP, Specialist, Home Health nurse, Urgent Care, ED, 911: Yes (11/20/2024  9:43 AM)  Patient/Caregiver Education Comments: Patient verbalized understanding of discharge instructions. Provided contact information for nonurgent questions or concerns. (11/20/2024  9:43 AM)    Wrap Up  Wrap Up Additional Comments: 64yoM with PMHx of hypertension, type 2 diabetes mellitus, paroxysmal A-fib, Charcot's My Tooth who presented with shortness of breath and palpitations. Patient diagnosed with paroxysmal a-fib and treated with diltiazem with good rate control. Cardiology consulted. Patient had a TTE which was WNL. Patient discharged to home self care. Metoprolol succinate increased to twice daily. Cardiac EP referral made. Patient now c/o frequent cough and concerns for elevated blood sugars while hospitalized. (11/20/2024  9:43 AM)

## 2024-11-27 ENCOUNTER — APPOINTMENT (OUTPATIENT)
Dept: PRIMARY CARE | Facility: CLINIC | Age: 64
End: 2024-11-27
Payer: MEDICARE

## 2024-11-27 VITALS
OXYGEN SATURATION: 97 % | HEART RATE: 66 BPM | SYSTOLIC BLOOD PRESSURE: 138 MMHG | WEIGHT: 200 LBS | DIASTOLIC BLOOD PRESSURE: 80 MMHG | BODY MASS INDEX: 29.52 KG/M2

## 2024-11-27 DIAGNOSIS — E03.9 HYPOTHYROIDISM, UNSPECIFIED TYPE: ICD-10-CM

## 2024-11-27 DIAGNOSIS — G47.33 OBSTRUCTIVE SLEEP APNEA: ICD-10-CM

## 2024-11-27 DIAGNOSIS — E11.8 CONTROLLED TYPE 2 DIABETES MELLITUS WITH COMPLICATION, WITHOUT LONG-TERM CURRENT USE OF INSULIN (MULTI): Primary | ICD-10-CM

## 2024-11-27 DIAGNOSIS — G47.9 PERSISTENT SLEEP DISORDER: ICD-10-CM

## 2024-11-27 DIAGNOSIS — M15.3 OTHER SECONDARY OSTEOARTHRITIS OF MULTIPLE SITES: ICD-10-CM

## 2024-11-27 DIAGNOSIS — I48.0 PAROXYSMAL A-FIB (MULTI): Primary | ICD-10-CM

## 2024-11-27 DIAGNOSIS — Z23 IMMUNIZATION DUE: ICD-10-CM

## 2024-11-27 RX ORDER — METOPROLOL SUCCINATE 25 MG/1
25 TABLET, EXTENDED RELEASE ORAL
Qty: 180 TABLET | Refills: 3 | Status: SHIPPED | OUTPATIENT
Start: 2024-11-27 | End: 2024-11-29 | Stop reason: SDUPTHER

## 2024-11-27 RX ORDER — NABUMETONE 750 MG/1
750 TABLET, FILM COATED ORAL DAILY PRN
Status: SHIPPED
Start: 2024-11-27

## 2024-11-27 NOTE — PROGRESS NOTES
"Patient: Morris Aquino  : 1960  PCP: Ct Win MD  MRN: [id number]  Program: Transitional Care Management  Status: Enrolled  Effective Dates: 2024 - present  Responsible Staff: Cari Ríos RN  Social Drivers to be Addressed: Physical Activity, Social Connections, Stress         Morris Aquino is a 64 y.o. male presenting today for follow-up after being discharged from the hospital 8 days ago. The main problem requiring admission was aflutter . The discharge summary and/or Transitional Care Management documentation was reviewed. Medication reconciliation was performed as indicated via the \"Morris as Reviewed\" timestamp.     Morris Aquino was contacted by Transitional Care Management services two days after his discharge. This encounter and supporting documentation was reviewed.      Pt had called  due to heart racing -   Known hx of afib/flutter -   Took extra metoprolol per my direction -   Did not improve -   To the hospital - found to be in Aflutter  -   Given IV Diltiazem in ER   Admitted  overnight   Back to normal sinus by the next day   Was to follow up with EPS   Metoprolol back to 25 mg BID     TSH mildly elevated around 6    Echo  24  CONCLUSIONS:   1. The left ventricular systolic function is normal, with a visually estimated ejection fraction of 55-60%.   2. No left ventricular thrombus visualized.   3. There is normal right ventricular global systolic function.    He stopped the daily caffeine -   Did take some to get here       Has appt with cardiology  - EPS       Feeling tired       TSH was elevated -   Was taking levothyroxine daily  -   One a day but 1/2 once a week     Known  BRAXTON -   Has a CPAP  - but cannot tolerate it very well  Long time sleep disorder       Review of Systems    /80 (BP Location: Left arm, Patient Position: Sitting, BP Cuff Size: Large adult)   Pulse 66   Wt 90.7 kg (200 lb)   SpO2 97%   BMI 29.52 kg/m²     Physical Exam  Vitals " reviewed.   Constitutional:       General: He is not in acute distress.     Appearance: Normal appearance.      Comments: Uses cane    HENT:      Head: Normocephalic and atraumatic.      Nose: Nose normal.      Mouth/Throat:      Mouth: Mucous membranes are moist.      Pharynx: No posterior oropharyngeal erythema.   Eyes:      Extraocular Movements: Extraocular movements intact.      Conjunctiva/sclera: Conjunctivae normal.      Pupils: Pupils are equal, round, and reactive to light.   Cardiovascular:      Rate and Rhythm: Normal rate and regular rhythm.      Heart sounds: Normal heart sounds. No murmur heard.  Pulmonary:      Effort: Pulmonary effort is normal. No respiratory distress.      Breath sounds: Normal breath sounds. No wheezing.   Musculoskeletal:      Cervical back: No rigidity.   Lymphadenopathy:      Cervical: No cervical adenopathy.   Skin:     General: Skin is warm and dry.      Findings: No rash.   Neurological:      General: No focal deficit present.      Mental Status: He is alert. Mental status is at baseline.   Psychiatric:         Mood and Affect: Mood normal.         Thought Content: Thought content normal.         The complexity of medical decision making for this patient's transitional care is moderate.    Assessment/Plan   Assessment & Plan  Paroxysmal A-fib (Multi)    Orders:    metoprolol succinate XL (Toprol-XL) 25 mg 24 hr tablet; Take 1 tablet (25 mg) by mouth twice a day. Do not crush or chew.    Currently in NSR -   Will see EPS    Better on higher metoprolol - but tired     Urged trying to wear CPAP and if can't  - to sleep medicine - he is contemplating   Worried about costs     Other secondary osteoarthritis of multiple sites    Orders:    nabumetone (Relafen) 750 mg tablet; Take 1 tablet (750 mg) by mouth once daily as needed for mild pain (1 - 3).    Just taking once daily -   Discussed risk of bleed   Immunization due    Orders:    Flu vaccine, trivalent, preservative free,  HIGH-DOSE, age 65y+ (Fluzone)    Persistent sleep disorder         Obstructive sleep apnea         Hypothyroidism, unspecified type       will increase levothyroxine by 1/2 pill a week  -  Recheck next appt       Patient was identified as a fall risk. Risk prevention instructions provided.      We discussed at visit any disease processes that were of concern as well as the risks, benefits and instructions of any new medication provided.    See orders and discussion section for information provided to patient in their After Visit Summary.   Patient (and/or caretaker of patient if present)  stated all questions were answered, and they voiced understanding of instructions.

## 2024-11-27 NOTE — PATIENT INSTRUCTIONS
Be sure to take the Levothyroxine 100 mcg every day       Keep appt with EPS       Keep the appt in December       If you decide to see the sleep specialist  - let me know          Ways to Help Prevent Falls at Home    Quick Tips   ? Ask for help if you need it. Most people want to help!   ? Get up slowly after sitting or laying down   ? Wear a medical alert device or keep cell phone in your pocket   ? Use night lights, especially areas near a bathroom   ? Keep the items you use often within reach on a small stool or end table   ? Use an assistive device such as walker or cane, as directed by provider/physical therapy   ? Use a non-slip mat and grab bars in your bathroom. Look for home health sections for best options     Other Areas to Focus On   ? Exercise and nutrition: Regular exercise or taking a falls prevention class are great ways improve strength and balance. Don’t forget to stay hydrated and bring a snack!   ? Medicine side effects: Some medicines can make you sleepy or dizzy, which could cause a fall. Ask your healthcare provider about the side effects your medicines could cause. Be sure to let them know if you take any vitamins or supplements as well.   ? Tripping hazards: Remove items you could trip on, such as loose mats, rugs, cords, and clutter. Wear closed toe shoes with rubber soles.   ? Health and wellness: Get regular checkups with your healthcare provider, plus routine vision and hearing screenings. Talk with your healthcare provider about:   o Your medicines and the possible side effects - bring them in a bag if that is easier!   o Problems with balance or feeling dizzy   o Ways to promote bone health, such as Vitamin D and calcium supplements   o Questions or concerns about falling     *Ask your healthcare team if you have questions     [facility], 2022

## 2024-11-27 NOTE — ASSESSMENT & PLAN NOTE
Orders:    nabumetone (Relafen) 750 mg tablet; Take 1 tablet (750 mg) by mouth once daily as needed for mild pain (1 - 3).    Just taking once daily -   Discussed risk of bleed

## 2024-11-27 NOTE — PROGRESS NOTES
I reviewed the progress note and agree with the resident’s findings and plans as written. Case discussed with resident.    Angie Gutierres, PharmD  Clinical Pharmacist - Primary Care  [phone]

## 2024-11-29 DIAGNOSIS — I48.0 PAROXYSMAL A-FIB (MULTI): ICD-10-CM

## 2024-11-29 RX ORDER — METOPROLOL SUCCINATE 25 MG/1
25 TABLET, EXTENDED RELEASE ORAL
Qty: 180 TABLET | Refills: 3 | Status: SHIPPED | OUTPATIENT
Start: 2024-11-29 | End: 2025-11-24

## 2024-12-04 ENCOUNTER — PATIENT OUTREACH (OUTPATIENT)
Dept: PRIMARY CARE | Facility: CLINIC | Age: 64
End: 2024-12-04
Payer: MEDICARE

## 2024-12-04 NOTE — PROGRESS NOTES
Call regarding appt with PCP Dr. Marvin after hospitalization. At time of outreach call, the patient feels as if his condition has improved. No further heart palpitations. Patient verbalized understanding of medication changes made at appt.

## 2024-12-07 LAB — NONINV COLON CA DNA+OCC BLD SCRN STL QL: POSITIVE

## 2024-12-09 NOTE — PROGRESS NOTES
I called pt   -   Pos fabio  Needs colonoscopy -     He does not have anyone who can take him  -   I will check into this

## 2024-12-10 DIAGNOSIS — R19.5 POSITIVE COLORECTAL CANCER SCREENING USING COLOGUARD TEST: ICD-10-CM

## 2024-12-10 DIAGNOSIS — Z12.11 SCREEN FOR COLON CANCER: Primary | ICD-10-CM

## 2024-12-13 ENCOUNTER — TELEMEDICINE (OUTPATIENT)
Dept: PHARMACY | Facility: HOSPITAL | Age: 64
End: 2024-12-13
Payer: MEDICARE

## 2024-12-13 DIAGNOSIS — E11.8 CONTROLLED TYPE 2 DIABETES MELLITUS WITH COMPLICATION, WITHOUT LONG-TERM CURRENT USE OF INSULIN (MULTI): ICD-10-CM

## 2024-12-13 RX ORDER — IBUPROFEN 200 MG
1 CAPSULE ORAL DAILY
Qty: 100 EACH | Refills: 3 | Status: SHIPPED | OUTPATIENT
Start: 2024-12-13

## 2024-12-13 NOTE — PROGRESS NOTES
Pharmacy Post-Discharge Visit    Morris Aquino is a 64 y.o. male who was referred to Clinical Pharmacy Team to complete a post-discharge medication optimization and monitoring visit.  The patient was referred for their Diabetes.    Pt is here for First appointment.     Admission Date: 11/18/24  Discharge Date: 11/19/24  Discharge Diagnosis: Paroxysmal atrial fibrillation (Multi), hypothyroidism, Charcot Cari tooth, BPH    Referring Provider/PCP: Ct Win*  Last Visit: 11/27/24  Next visit: 12/30/24    Since discharge, had follow up with PCP, increased metoprolol XL to 25mg twice daily. Endorses fatigue; denies dizziness or lightheadedness. He discontinued caffeine tablets. Does not check BP at home.    Subjective   DIABETES MELLITUS TYPE 2:    Known diabetic complications: None.  Does patient follow with Endocrinology: No     Current diabetic medications include:  Metformin 1000mg twice daily     Adverse Effects: None    Past diabetic medications include:  None    Glucose Readings:  Glucometer/CGM Type: Does not recall brand, needs test strips refilled  Current home BG readings: N/A   Any episodes of hypoglycemia? No  Does the patient have a prescription for ready-to-use Glucagon? Not on insulin  Does pt have proteinuria? No    Secondary Prevention:  Statin? Yes  ACE-I/ARB? No  Aspirin? No    Notable Medication changes following discharge  Start: none  Stop: caffeine tablets  Change: none    Medication Reconciliation  Changed: none  Added: none  Discontinued: none    Drug Interactions  No relevant drug interactions were noted.    Medication System Management  Patient's preferred pharmacy: [facility]   Adherence/Organization: weekly pillbox AM + PM ; misses one dose a month  Affordability/Accessibility:   Medicare Advantage   Fixed income disability/care home   obtains Trintellix through r PAP; using Eliquis samples while in donut hole    Objective   No Known Allergies  Social History      Social History Narrative    Not on file      Medication Review  Current Outpatient Medications   Medication Instructions    apixaban (ELIQUIS) 5 mg, oral, 2 times daily    atorvastatin (LIPITOR) 80 mg, oral, Nightly    blood sugar diagnostic (Blood Glucose Test) strip In Vitro Strips. TEST ONCE DAILY AS DIRECTED DX E11.9    cetirizine (ZyrTEC) 10 mg tablet 1 tablet, Daily    cholecalciferol (VITAMIN D3) 50 mcg, oral, Daily    clobetasol (Temovate) 0.05 % cream APPLY SPARINGLY TO AFFECTED AREA(S) TWICE DAILY    FreeStyle Lite Strips strip TEST ONCE DAILY    LANCETS MISC 28G. Test once daily - DX E 11.9    levothyroxine (SYNTHROID, LEVOXYL) 100 mcg, oral, Daily    metFORMIN (Glucophage) 500 mg tablet TAKE 2 TABLETS TWICE A DAY WITH MORNING AND EVENING MEALS    metoprolol succinate XL (TOPROL-XL) 25 mg, oral, Twice a day (morning and mid-day), Do not crush or chew.    nabumetone (RELAFEN) 750 mg, oral, Daily PRN    omeprazole (PriLOSEC) 20 mg DR capsule 1 capsule, Daily    pregabalin (Lyrica) 75 mg capsule TAKE 1 CAPSULE THREE TIMES A DAY    tamsulosin (Flomax) 0.4 mg 24 hr capsule TAKE 1 CAPSULE ONCE DAILY 30 MINUTES FOLLOWING THE SAME MEAL DAILY    traMADol (ULTRAM) 50 mg, oral, Every 6 hours PRN    Trintellix 20 mg, oral, Daily      Vitals  BP Readings from Last 2 Encounters:   11/27/24 138/80   11/19/24 130/90     BMI Readings from Last 1 Encounters:   11/27/24 29.52 kg/m²      Labs  Lab Results   Component Value Date    HGBA1C 6.2 09/27/2024    HGBA1C 6.8 (H) 06/27/2024    HGBA1C 6.7 (A) 03/26/2024     Lab Results   Component Value Date    CALCIUM 9.0 11/19/2024     11/19/2024    K 4.1 11/19/2024    CO2 27 11/19/2024     11/19/2024    BUN 15 11/19/2024    CREATININE 1.08 11/19/2024    EGFR 76 11/19/2024     Lab Results   Component Value Date    ALT 26 11/18/2024    AST 21 11/18/2024    ALKPHOS 76 11/18/2024    BILITOT 0.7 11/18/2024     Lab Results   Component Value Date    TRIG 163 (H) 06/27/2024     CHOL 134 2024    LDLF 68 2023    LDLCALC 66 2024    HDL 35.1 2024     Lab Results   Component Value Date    MICROALBCREA SEE COMMENT 2023     Wt Readings from Last 3 Encounters:   24 90.7 kg (200 lb)   24 88.7 kg (195 lb 8.8 oz)   24 89.4 kg (197 lb)      There is no height or weight on file to calculate BMI.       Assessment/Plan   Problem List Items Addressed This Visit       Controlled type 2 diabetes mellitus (Multi)     Patient's goal A1c is < 7%.  Is pt at goal? Yes, 6.2%    Medication Changes:  CONTINUE  Metformin 1000mg twice daily   Refill sent for test strips per request    Monitoring and Education:   Blood sugar goals  Fastin - 130 mg/dL  2 hours after meal: less than 180 mg/dL  A1c: less than 7%      Patient Assistance Screening (VAF)  Patient verbally reports monthly or yearly income which is less than 400% federal poverty level  Application for program has been submitted for the following medications:   Eliquis (referral request sent to PCP)   Patient aware this process may take up to 2 weeks once income documents have been sent to the team.  Patient to email required income document  If approved, medication must be filled through [facility] pharmacy and may be picked up or mailed to patient.   If approved, medication will be billed through insurance, and patient assistance team will pay the copay. This will result in a $0 copay for the patient.  Counseled patient on mechanism of action, side effects, contraindications, and what to do if the patient misses a dose. All patients questions were answered.      Clinical Pharmacist follow-up: pending PAP determination, Telehealth visit    Continue all meds under the continuation of care with the referring provider and clinical pharmacy team.    Thank you,  Nakita Hitchcock  Clinical Pharmacist  [phone]    Verbal consent to manage patient's drug therapy was obtained from the patient. They were  informed they may decline to participate or withdraw from participation in pharmacy services at any time.

## 2024-12-13 NOTE — Clinical Note
Clayton Win. Morris was seen for post discharge med review and noted concerns with Eliquis cost. Ok to add Eliquis to our pharmacy referral to apply for  patient assistance program? Thank you!

## 2024-12-16 PROCEDURE — RXMED WILLOW AMBULATORY MEDICATION CHARGE

## 2024-12-16 NOTE — PROGRESS NOTES
Referral received from Dr. Win for addition of Eliquis management and application for  PAP 12/13/24. Income document received from patient and application submitted 12/16/24.    ATRIAL FIBRILLATION ASSESSMENT:   [id number] Score: [3]   Age: [<65 (0)] [65-74 (+1)] [> 75 (+2)]: 1  Sex: [Male/Female (+1)]: 0  CHF history: [No/Yes(+1)]: 0  Hypertension history: [No/Yes(+1)]: 1  Stroke/TIA/thromboembolism history: [No/Yes(+2)]: 0  Vascular disease history (prior MI, peripheral artery disease, aortic plaque): [No/Yes(+1)]: 0  Diabetes history: [No/Yes(+1)]: 1  Is the patient currently on anticoagulation therapy? Yes, describe: Eliquis 5mg twice daily      EDUCATION/COUNSELING:   Counseled patient on MOA, expectations, duration of therapy, contraindications, administration, and monitoring parameters  Counseled patient of side effects that are indicative of bleeding such as dark tarry stool, unexplainable bruising, or vomiting up a coffee ground like substance  Counseled patient on common symptoms of active atrial fibrillation such as palpitations, fatigue, shortness of breath, dizziness/lightheadedness, or chest pain/discomfort  Counseled patient on the importance of physical activity - recommended 210 minutes per week of moderate-to-vigorous exercise  Counseled patient on the importance of limiting or eliminating tobacco, alcohol, and caffeine use     PLAN:  Continue Eliquis 5mg twice daily.     Patient Assistance Program Approval:   We are pleased to inform you that your application for assistance has been approved.   This approval is valid through 12/16/25 as long as the following criteria continue to be satisfied:     Your medication (Eliquis) remains covered under your current insurance plan.   Your prescriber does not discontinue therapy.   You do not seek reimbursement from any other private or government-funded programs for the  medication.    Under this program, the pharmacy will first bill your  insurance plan for your indemnified specified medication. The [employer] Assistance Fund will then offset your copay balance, so that your out-of pocket expense for your specialty medication will be $0.00.    Catalina Hitchcock PharmD   Pharmacy follow up in 1 year for PAP renewal.

## 2024-12-18 ENCOUNTER — PHARMACY VISIT (OUTPATIENT)
Dept: PHARMACY | Facility: CLINIC | Age: 64
End: 2024-12-18
Payer: COMMERCIAL

## 2024-12-18 DIAGNOSIS — E11.8 CONTROLLED TYPE 2 DIABETES MELLITUS WITH COMPLICATION, WITHOUT LONG-TERM CURRENT USE OF INSULIN (MULTI): ICD-10-CM

## 2024-12-18 RX ORDER — METFORMIN HYDROCHLORIDE 500 MG/1
TABLET ORAL
Qty: 360 TABLET | Refills: 3 | Status: SHIPPED | OUTPATIENT
Start: 2024-12-18

## 2024-12-19 DIAGNOSIS — G62.89 OTHER POLYNEUROPATHY: ICD-10-CM

## 2024-12-19 RX ORDER — PREGABALIN 75 MG/1
CAPSULE ORAL
Qty: 270 CAPSULE | Refills: 1 | Status: SHIPPED | OUTPATIENT
Start: 2024-12-19

## 2024-12-30 ENCOUNTER — APPOINTMENT (OUTPATIENT)
Dept: PRIMARY CARE | Facility: CLINIC | Age: 64
End: 2024-12-30
Payer: MEDICARE

## 2024-12-30 VITALS
OXYGEN SATURATION: 96 % | BODY MASS INDEX: 29.37 KG/M2 | HEART RATE: 110 BPM | WEIGHT: 199 LBS | SYSTOLIC BLOOD PRESSURE: 124 MMHG | DIASTOLIC BLOOD PRESSURE: 86 MMHG

## 2024-12-30 DIAGNOSIS — M15.0 PRIMARY OSTEOARTHRITIS INVOLVING MULTIPLE JOINTS: ICD-10-CM

## 2024-12-30 DIAGNOSIS — G60.0 CMT (CHARCOT-MARIE-TOOTH DISEASE): ICD-10-CM

## 2024-12-30 DIAGNOSIS — G62.89 OTHER POLYNEUROPATHY: ICD-10-CM

## 2024-12-30 DIAGNOSIS — E11.8 CONTROLLED TYPE 2 DIABETES MELLITUS WITH COMPLICATION, WITHOUT LONG-TERM CURRENT USE OF INSULIN (MULTI): Primary | ICD-10-CM

## 2024-12-30 DIAGNOSIS — E03.9 HYPOTHYROIDISM, UNSPECIFIED TYPE: ICD-10-CM

## 2024-12-30 DIAGNOSIS — Z79.899 ENCOUNTER FOR LONG-TERM (CURRENT) USE OF HIGH-RISK MEDICATION: ICD-10-CM

## 2024-12-30 LAB
POC HEMOGLOBIN A1C: 6.5 % (ref 4.2–6.5)
TSH SERPL-ACNC: 3.42 MIU/L (ref 0.44–3.98)

## 2024-12-30 PROCEDURE — G2211 COMPLEX E/M VISIT ADD ON: HCPCS | Performed by: FAMILY MEDICINE

## 2024-12-30 PROCEDURE — 80368 SEDATIVE HYPNOTICS: CPT

## 2024-12-30 PROCEDURE — 1159F MED LIST DOCD IN RCRD: CPT | Performed by: FAMILY MEDICINE

## 2024-12-30 PROCEDURE — 3074F SYST BP LT 130 MM HG: CPT | Performed by: FAMILY MEDICINE

## 2024-12-30 PROCEDURE — 3044F HG A1C LEVEL LT 7.0%: CPT | Performed by: FAMILY MEDICINE

## 2024-12-30 PROCEDURE — 99214 OFFICE O/P EST MOD 30 MIN: CPT | Performed by: FAMILY MEDICINE

## 2024-12-30 PROCEDURE — 1160F RVW MEDS BY RX/DR IN RCRD: CPT | Performed by: FAMILY MEDICINE

## 2024-12-30 PROCEDURE — 82570 ASSAY OF URINE CREATININE: CPT

## 2024-12-30 PROCEDURE — 82043 UR ALBUMIN QUANTITATIVE: CPT

## 2024-12-30 PROCEDURE — 84443 ASSAY THYROID STIM HORMONE: CPT

## 2024-12-30 PROCEDURE — 83036 HEMOGLOBIN GLYCOSYLATED A1C: CPT | Performed by: FAMILY MEDICINE

## 2024-12-30 PROCEDURE — 3048F LDL-C <100 MG/DL: CPT | Performed by: FAMILY MEDICINE

## 2024-12-30 PROCEDURE — 1123F ACP DISCUSS/DSCN MKR DOCD: CPT | Performed by: FAMILY MEDICINE

## 2024-12-30 PROCEDURE — 80307 DRUG TEST PRSMV CHEM ANLYZR: CPT

## 2024-12-30 PROCEDURE — 80365 DRUG SCREENING OXYCODONE: CPT

## 2024-12-30 PROCEDURE — 80358 DRUG SCREENING METHADONE: CPT

## 2024-12-30 PROCEDURE — 80373 DRUG SCREENING TRAMADOL: CPT

## 2024-12-30 PROCEDURE — 80354 DRUG SCREENING FENTANYL: CPT

## 2024-12-30 PROCEDURE — 80346 BENZODIAZEPINES1-12: CPT

## 2024-12-30 PROCEDURE — 3079F DIAST BP 80-89 MM HG: CPT | Performed by: FAMILY MEDICINE

## 2024-12-30 PROCEDURE — 80361 OPIATES 1 OR MORE: CPT

## 2024-12-30 PROCEDURE — 1036F TOBACCO NON-USER: CPT | Performed by: FAMILY MEDICINE

## 2024-12-30 RX ORDER — TRAMADOL HYDROCHLORIDE 50 MG/1
50 TABLET ORAL EVERY 6 HOURS PRN
Qty: 360 TABLET | Refills: 0 | Status: SHIPPED | OUTPATIENT
Start: 2024-12-30

## 2024-12-30 NOTE — PATIENT INSTRUCTIONS
Be sure to hold your Eliquis 3 days prior to your procedure     We are checking into the Tramadol prior auth     Call your prescription plan - find out if costs change for 2025 - if deductible is too much -   Ask to be placed on the payment plan  (M3P)     Your follow up with cardiology is important       Diabetic  Patient Reminders:    Please check your blood sugars  - on average - three times a week in the morning, and three times a week 2 hours after a meal.  That gives me a good idea of the range of your sugars.  If you do this regularly, it will help you learn how your body responds to your nutrition and exercise.  If you do not want to do this regularly, bringing sugars from the latest 2 weeks before your appointment will help.      PLEASE KEEP A LOGBOOK OF THESE SUGARS.    It is very important for you to have a regular exercise routine - at least 30 minutes 5 days a week.    It is important to inspect your feet regularly, as diabetics often get numbness of their feet and then cannot feel wounds.    It is generally recommended for diabetics to take one baby aspirin daily. Be sure to clarify this with me if I have not told you to do this.    It is very important for you to get a yearly flu shot and a PREVNAR 20 vaccine to help prevent pneumonia.    Please consider getting vaccinated against COVID-19 as well.        Watching your nutrition is VERY important  - always! You want to be eating a diet low in sodium (2186-2174 mg a day); low in starches and sweets; plenty of fresh fruits and veggies (the fresher the better); whole grains; lean meats and dairy that are low in saturated and trans fats.    Watch the amounts you eat as well - pay attention to serving size.    It is important for diabetics to see their eye doctor as least once year, and their dentist every 6 months.         For General Healthy Nutrition    (Remember - NOT A DIET!   Diets are only good for class reunions.)    These are my general good  "nutrition recommendations for most people.   I use the term \" diet \"  in these instructions to mean your overall nutrition - how you eat and drink.   If we talked about something different during your visit with me,  other than what is written below,  follow that advice instead.       For most people,  eating healthier means getting less added sugar and less processed foods in your diet    The fresher the better.    Added sugar is now a part of the nutrition label on manufactured food, so you can keep an eye on it easier.    But basically,  foods and beverages  that contain regular sugar and corn syrup are the main sources of added sugars.  Eating as little of these foods as you can is best.   One shocking example of the epidemic of added sugar is soda.    One can of regular soda contains about as much added sugar as 3 regular size doughnuts!     The other issue with processed foods is the amount of processed grains they contain , such as white flour.    This is also something you want to try to limit in your diet.     But, grain products are very important for your nutrition.    Whole grains are better for your body.     Cutting back on white breads, traditional pasta, baked goods, white rice,  and processed cereals will be healthier for you.   The better choices include whole grain breads,  whole wheat pasta,  brown rice, quinoa, barley, steel cut  or rolled oats.   If you eat cereal for breakfast, try to look for one made with whole grains and less sugar.   There are many people who have a problem with gluten, for a large variety of reasons.    Generally,  products made with wheat flour , barley or rye are the primary source of gluten.       Cutting back on saturated fats is important.    You want the majority of the meat that you eat to be chicken, fish or turkey.   Baked or broiled is best -  fried adds too much fat.    There are healthy fats that are important - fat is important for holding down appetite, " "vitamin absorption and several metabolic processes in the body.  Monounsaturated fats raise HDL (good cholesterol) and lower LDL (bad cholesterol).   Olive oil, peanut oil, nuts, seeds, and avocados are great sources of the good fats.       Ideas are:   Trade sour cream dip for hummus (which is rich in olive oil) or guacamole; use veggies or whole-wheat chips to dip.    Nuts are an excellent source of protein and healthy fats.   Tree nuts are the best kind, such as almonds or walnuts.   Just be careful - they are high in calories, so stick to a serving size.  (Most are about 200 calories for a 1/4 cup)      Proteins are very important for your body, and they also hold down your appetite.   Try to have protein with every meal.    These generally are meats, nuts, many beans, legumes, eggs, and dairy.   You will find protein in whole grain products and some green vegetables have a little too.     When you have dairy (if you can - many people are lactose intolerant) try to make it low fat.    Ideas are 1% milk, lowfat yogurt or cheeses, low fat cottage cheese.   I don't generally recommend FAT FREE because they often contain artificial products to improve taste, and the fat helps hold down your appetite.   If you are lactose intolerant, try to see if taking Lactaid before having dairy helps.      Fresh fruits and vegetables are VERY important.  The brighter the better.   Many vegetables are considered \"Free Foods\" - meaning you can eat as much as you want, and it does not matter.  These include tomatoes, cucumbers, celery, peppers, all the various lettuces and kale - to name a few.   Potatoes, corn and peas are starchy, so do have more calories, but are still healthy - you just want to watch the amount of them you eat.       Fruits are full of wonderful nutrition.   They contain natural sugar called fructose, so eating them in moderation is best.   Diabetics may need to pay careful attention to how their body reacts to " the sugar.  Some fruits might drastically increase their blood sugar.      Eating smaller meals with a couple of small snacks is better for your metabolism than not eating for long amounts of time  (breakfast is very important).   Trying to avoid large meals is helpful too.    Eating like this helps keep your appetite down and keeps you in burning metabolism rather than storage metabolism so your body will use the calories you eat.       I do not tell people to stop eating sweets or snack foods - just limit the amounts you have.  The less the better.   Pay attention to serving sizes, and treat them as a treat.        Foods like doughnuts, pop tarts, sugar cereals, cookies  ARE NOT GOOD FOR BREAKFAST.   They are loaded with sugar and will cause you to be hungrier in the day and often not feel well.    Caffeine needs to be limited - no more than 2 servings a day.  Some people can't have any at all.    (if you have any sleep or anxiety issues - stop the caffeine)   Coffee, many teas, many sodas, energy drinks, almost any diet supplement,  and chocolate all contain caffeine.      Water is important.   For most people, 8   x  8 ounces  a day are needed.  This may vary for some health issues.    If you need to be on a low sodium diet, that means looking at labels and eating only 1000 - 2000 mg of sodium a day.    Calcium intake is important.  3 servings of a high calcium food or drink a day is recommended.   This is usually a cup of milk, a cup of yogurt, an ounce of a hard cheese or 1.5 ounces of a soft cheese are the usual servings.   There are other high calcium foods - including soy or almond milk, broccoli,  almonds, dark green leafy vegetable.   Make sure you are not getting more than 1000  - 1200 mg of total calcium a day (unless you have been told you need more by a doctor).    Vitamin D 3 is important to absorb the calcium and for your immune system.   For children, 400 IU a day is recommended.   For adults - 800  - 5000 IU a day  is recommended.  (Often the amount needed is individualized for adults - be sure to ask how much is right for you)    Physical activity is very important for good health.    Finding activities that give you regular exercise is very important for good health.  Try to find exercise you enjoy doing on a regular basis.    30 minutes at least 5 days a week of a good cardiovascular exercise is recommended.   That means something that gets your heart rate going faster than your usual baseline and you can find yourself breathing harder than usual while you are exercising.  If you have not done any exercise in a long time,  make sure you ask if its safe for you to start,  and be sure to gradually work up to your goal.      If you need to lose weight,  following these recommendations will help you.   And if you are doing all of this and still not losing weight, then its likely just the amount of food you are eating.   Learn to cut back on portion sizes.  Using smaller plates may help.  Healthy weight loss is  only about a pound a week.   You have to remember that whatever you do to lose the weight, you must be prepared to keep it up for life for the weight to stay off.     A lot of people have a lot of luck with using something like a fit bit,  or a program where you keep track of all of your calories that you eat and what you burn off in the day.
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
## 2024-12-31 LAB
AMPHETAMINES UR QL SCN: NORMAL
BARBITURATES UR QL SCN: NORMAL
BZE UR QL SCN: NORMAL
CANNABINOIDS UR QL SCN: NORMAL
CREAT UR-MCNC: 174.9 MG/DL (ref 20–370)
CREAT UR-MCNC: 176.9 MG/DL (ref 20–370)
MICROALBUMIN UR-MCNC: <7 MG/L
MICROALBUMIN/CREAT UR: NORMAL MG/G{CREAT}
PCP UR QL SCN: NORMAL

## 2024-12-31 NOTE — ASSESSMENT & PLAN NOTE
Orders:    Albumin-Creatinine Ratio, Urine Random    POCT glycosylated hemoglobin (Hb A1C) manually resulted

## 2024-12-31 NOTE — ASSESSMENT & PLAN NOTE
Orders:    traMADol (Ultram) 50 mg tablet; Take 1 tablet (50 mg) by mouth every 6 hours if needed for severe pain (7 - 10).      We likely need to get a prior authorization for his chronic tramadol  -   Pt has been on tramadol for years and it provides him relief from his chronic pain  -   He tolerates it well -   We updated his contract today and are getting his urine drug screen today   We have discussed the risks of this medication including addiction and dependence.

## 2024-12-31 NOTE — ASSESSMENT & PLAN NOTE
Orders:    traMADol (Ultram) 50 mg tablet; Take 1 tablet (50 mg) by mouth every 6 hours if needed for severe pain (7 - 10).

## 2025-01-01 ENCOUNTER — TELEPHONE (OUTPATIENT)
Dept: PRIMARY CARE | Facility: CLINIC | Age: 65
End: 2025-01-01
Payer: MEDICARE

## 2025-01-02 NOTE — TELEPHONE ENCOUNTER
----- Message from Cecily WADE sent at 12/31/2024 12:46 PM EST -----  Regarding: FW: Tramadol  Called [facility] [phone] Ins ID# [id number], Spoke to Love.  There is usually a 7 day allowable for naive or first time filling of these types of prescriptions, Morris is not naive, the #90 day supply from 12/30/24 was processed correctly, should not have issues in future.  ----- Message -----  From: Ct Win MD  Sent: 12/30/2024   1:26 PM EST  To: Cecily Sousa MA; Tigist gA MA  Subject: Tramadol                                         Morris has only been able to get 7 day RX of tramadol at a time from [facility] since October  -   I imagine its because he needs a prior auth for a 90 day supply again -   Please start that.

## 2025-01-03 LAB
1OH-MIDAZOLAM UR CFM-MCNC: <25 NG/ML
6MAM UR CFM-MCNC: <25 NG/ML
7AMINOCLONAZEPAM UR CFM-MCNC: <25 NG/ML
A-OH ALPRAZ UR CFM-MCNC: <25 NG/ML
ALPRAZ UR CFM-MCNC: <25 NG/ML
CHLORDIAZEP UR CFM-MCNC: <25 NG/ML
CLONAZEPAM UR CFM-MCNC: <25 NG/ML
CODEINE UR CFM-MCNC: <50 NG/ML
DIAZEPAM UR CFM-MCNC: <25 NG/ML
EDDP UR CFM-MCNC: <25 NG/ML
FENTANYL UR CFM-MCNC: <2.5 NG/ML
HYDROCODONE CTO UR CFM-MCNC: <25 NG/ML
HYDROMORPHONE UR CFM-MCNC: <25 NG/ML
LORAZEPAM UR CFM-MCNC: <25 NG/ML
METHADONE UR CFM-MCNC: <25 NG/ML
MIDAZOLAM UR CFM-MCNC: <25 NG/ML
MORPHINE UR CFM-MCNC: <50 NG/ML
NORDIAZEPAM UR CFM-MCNC: <25 NG/ML
NORFENTANYL UR CFM-MCNC: <2.5 NG/ML
NORHYDROCODONE UR CFM-MCNC: <25 NG/ML
NOROXYCODONE UR CFM-MCNC: <25 NG/ML
NORTRAMADOL UR-MCNC: >1000 NG/ML
OXAZEPAM UR CFM-MCNC: <25 NG/ML
OXYCODONE UR CFM-MCNC: <25 NG/ML
OXYMORPHONE UR CFM-MCNC: <25 NG/ML
TEMAZEPAM UR CFM-MCNC: <25 NG/ML
TRAMADOL UR CFM-MCNC: >1000 NG/ML
ZOLPIDEM UR CFM-MCNC: <25 NG/ML
ZOLPIDEM UR-MCNC: <25 NG/ML

## 2025-01-08 ENCOUNTER — PATIENT OUTREACH (OUTPATIENT)
Dept: PRIMARY CARE | Facility: CLINIC | Age: 65
End: 2025-01-08
Payer: MEDICARE

## 2025-01-08 NOTE — PROGRESS NOTES
Outreach made for monthly post discharge follow up. At the time of outreach call the patient stated he is doing well and preparing for his upcoming colonoscopy. The patient worked with [facility] to arrange for transportation to and from his procedure. No questions or concerns for primary care at this time.

## 2025-01-10 DIAGNOSIS — E78.00 HYPERCHOLESTEREMIA: ICD-10-CM

## 2025-01-10 RX ORDER — ATORVASTATIN CALCIUM 80 MG/1
80 TABLET, FILM COATED ORAL NIGHTLY
Qty: 90 TABLET | Refills: 3 | Status: SHIPPED | OUTPATIENT
Start: 2025-01-10

## 2025-01-15 ENCOUNTER — ANESTHESIA EVENT (OUTPATIENT)
Dept: OPERATING ROOM | Facility: HOSPITAL | Age: 65
End: 2025-01-15
Payer: MEDICARE

## 2025-01-15 RX ORDER — DROPERIDOL 2.5 MG/ML
0.62 INJECTION, SOLUTION INTRAMUSCULAR; INTRAVENOUS ONCE AS NEEDED
Status: CANCELLED | OUTPATIENT
Start: 2025-01-15

## 2025-01-15 RX ORDER — ONDANSETRON HYDROCHLORIDE 2 MG/ML
4 INJECTION, SOLUTION INTRAVENOUS ONCE AS NEEDED
Status: CANCELLED | OUTPATIENT
Start: 2025-01-15

## 2025-01-16 ENCOUNTER — ANESTHESIA (OUTPATIENT)
Dept: OPERATING ROOM | Facility: HOSPITAL | Age: 65
End: 2025-01-16
Payer: MEDICARE

## 2025-01-16 ENCOUNTER — HOSPITAL ENCOUNTER (OUTPATIENT)
Dept: OPERATING ROOM | Facility: HOSPITAL | Age: 65
Setting detail: OUTPATIENT SURGERY
Discharge: HOME | End: 2025-01-16
Payer: MEDICARE

## 2025-01-16 VITALS
SYSTOLIC BLOOD PRESSURE: 143 MMHG | HEIGHT: 69 IN | OXYGEN SATURATION: 95 % | HEART RATE: 71 BPM | BODY MASS INDEX: 29.39 KG/M2 | DIASTOLIC BLOOD PRESSURE: 78 MMHG | RESPIRATION RATE: 16 BRPM | WEIGHT: 198.41 LBS | TEMPERATURE: 97.2 F

## 2025-01-16 DIAGNOSIS — Z12.11 SCREEN FOR COLON CANCER: ICD-10-CM

## 2025-01-16 DIAGNOSIS — R19.5 POSITIVE COLORECTAL CANCER SCREENING USING COLOGUARD TEST: ICD-10-CM

## 2025-01-16 PROBLEM — Z79.01 ANTICOAGULANT LONG-TERM USE: Status: ACTIVE | Noted: 2025-01-16

## 2025-01-16 LAB — GLUCOSE BLD MANUAL STRIP-MCNC: 143 MG/DL (ref 74–99)

## 2025-01-16 PROCEDURE — 99221 1ST HOSP IP/OBS SF/LOW 40: CPT | Performed by: STUDENT IN AN ORGANIZED HEALTH CARE EDUCATION/TRAINING PROGRAM

## 2025-01-16 PROCEDURE — 7100000010 HC PHASE TWO TIME - EACH INCREMENTAL 1 MINUTE: Performed by: STUDENT IN AN ORGANIZED HEALTH CARE EDUCATION/TRAINING PROGRAM

## 2025-01-16 PROCEDURE — 3700000002 HC GENERAL ANESTHESIA TIME - EACH INCREMENTAL 1 MINUTE: Performed by: STUDENT IN AN ORGANIZED HEALTH CARE EDUCATION/TRAINING PROGRAM

## 2025-01-16 PROCEDURE — 3700000001 HC GENERAL ANESTHESIA TIME - INITIAL BASE CHARGE: Performed by: STUDENT IN AN ORGANIZED HEALTH CARE EDUCATION/TRAINING PROGRAM

## 2025-01-16 PROCEDURE — 45385 COLONOSCOPY W/LESION REMOVAL: CPT | Performed by: STUDENT IN AN ORGANIZED HEALTH CARE EDUCATION/TRAINING PROGRAM

## 2025-01-16 PROCEDURE — 3600000007 HC OR TIME - EACH INCREMENTAL 1 MINUTE - PROCEDURE LEVEL TWO: Performed by: STUDENT IN AN ORGANIZED HEALTH CARE EDUCATION/TRAINING PROGRAM

## 2025-01-16 PROCEDURE — 7100000009 HC PHASE TWO TIME - INITIAL BASE CHARGE: Performed by: STUDENT IN AN ORGANIZED HEALTH CARE EDUCATION/TRAINING PROGRAM

## 2025-01-16 PROCEDURE — 45380 COLONOSCOPY AND BIOPSY: CPT | Performed by: STUDENT IN AN ORGANIZED HEALTH CARE EDUCATION/TRAINING PROGRAM

## 2025-01-16 PROCEDURE — 82947 ASSAY GLUCOSE BLOOD QUANT: CPT

## 2025-01-16 PROCEDURE — 3600000002 HC OR TIME - INITIAL BASE CHARGE - PROCEDURE LEVEL TWO: Performed by: STUDENT IN AN ORGANIZED HEALTH CARE EDUCATION/TRAINING PROGRAM

## 2025-01-16 PROCEDURE — 2500000004 HC RX 250 GENERAL PHARMACY W/ HCPCS (ALT 636 FOR OP/ED): Performed by: NURSE ANESTHETIST, CERTIFIED REGISTERED

## 2025-01-16 PROCEDURE — 2720000007 HC OR 272 NO HCPCS: Performed by: STUDENT IN AN ORGANIZED HEALTH CARE EDUCATION/TRAINING PROGRAM

## 2025-01-16 RX ORDER — PROPOFOL 10 MG/ML
INJECTION, EMULSION INTRAVENOUS CONTINUOUS PRN
Status: DISCONTINUED | OUTPATIENT
Start: 2025-01-16 | End: 2025-01-16

## 2025-01-16 RX ORDER — LIDOCAINE HCL/PF 100 MG/5ML
SYRINGE (ML) INTRAVENOUS AS NEEDED
Status: DISCONTINUED | OUTPATIENT
Start: 2025-01-16 | End: 2025-01-16

## 2025-01-16 RX ADMIN — LIDOCAINE HYDROCHLORIDE 70 MG: 20 INJECTION INTRAVENOUS at 12:58

## 2025-01-16 RX ADMIN — PROPOFOL 120 MCG/KG/MIN: 10 INJECTION, EMULSION INTRAVENOUS at 12:58

## 2025-01-16 SDOH — HEALTH STABILITY: MENTAL HEALTH: CURRENT SMOKER: 0

## 2025-01-16 ASSESSMENT — ENCOUNTER SYMPTOMS
RECTAL PAIN: 0
FEVER: 0
NAUSEA: 0
CHILLS: 0
DIARRHEA: 0
ANAL BLEEDING: 0
ABDOMINAL PAIN: 0
UNEXPECTED WEIGHT CHANGE: 0
ABDOMINAL DISTENTION: 0
VOMITING: 0
TROUBLE SWALLOWING: 0
SHORTNESS OF BREATH: 0
CONSTIPATION: 0
COLOR CHANGE: 0
BLOOD IN STOOL: 0

## 2025-01-16 ASSESSMENT — PAIN SCALES - GENERAL: PAIN_LEVEL: 0

## 2025-01-16 NOTE — DISCHARGE INSTRUCTIONS
Patient Instructions after a Colonoscopy      The anesthetics, sedatives or narcotics which were given to you today will be acting in your body for the next 24 hours, so you might feel a little sleepy or groggy.  This feeling should slowly wear off. Carefully read and follow the instructions.     You received sedation today:  - Do not drive or operate any machinery or power tools of any kind.   - No alcoholic beverages today, not even beer or wine.  - Do not make any important decisions or sign any legal documents.  - No over the counter medications that contain alcohol or that may cause drowsiness.  - Do not make any important decisions or sign any legal documents.  - Make sure you have someone with you for first 24 hours.    While it is common to experience mild to moderate abdominal distention, gas, or belching after your procedure, if any of these symptoms occur following discharge from the GI Lab or within one week of having your procedure, call the [facility] to be advised whether a visit to your nearest Urgent Care or Emergency Department is indicated.  Take this paper with you if you go.     - If you develop an allergic reaction to the medications that were given during your procedure such as difficulty breathing, rash, hives, severe nausea, vomiting or lightheadedness.  - If you experience chest pain, shortness of breath, severe abdominal pain, fevers and chills.  -If you develop signs and symptoms of bleeding such as blood in your spit, if your stools turn black, tarry, or bloody  - If you have not urinated within 8 hours following your procedure.  - If your IV site becomes painful, red, inflamed, or looks infected.    If you received a biopsy/polypectomy/sphincterotomy the following instructions apply below:    __ Do not use Aspirin containing products, non-steroidal medications or anti-coagulants for one week following your procedure. (Examples of these types of medications are: Advil,  Arthrotec, Aleve, Coumadin, Ecotrin, Heparin, Ibuprofen, Indocin, Motrin, Naprosyn, Nuprin, Plavix, Vioxx, and Voltarin, or their generic forms.  This list is not all-inclusive.  Check with your physician or pharmacist before resuming medications.)   __ Eat a soft diet today.  Avoid foods that are poorly digested for the next 24 hours.  These foods would include: nuts, beans, lettuce, red meats, and fried foods. Start with liquids and advance your diet as tolerated, gradually work up to eating solids.   __ Do not have a Barium Study or Enema for one week.    Your physician recommends the additional following instructions:    -You have a contact number available for emergencies. The signs and symptoms of potential delayed complications were discussed with you. You may return to normal activities tomorrow.  -Resume your previous diet.  -Continue your present medications.   -We are waiting for your pathology results.  -Your physician has recommended a repeat colonoscopy (date to be determined after pending pathology results are reviewed) for surveillance based on pathology results.  -The findings and recommendations have been discussed with you.  -The findings and recommendations were discussed with your family.  - Please see Medication Reconciliation Form for new medication/medications prescribed.       If you experience any problems or have any questions following discharge from the GI Lab, please call:        Nurse Signature                                                                        Date___________________                                                                            Patient/Responsible Party Signature                                        Date___________________

## 2025-01-16 NOTE — H&P
History Of Present Illness  Morris Aquino is a 64 y.o. male presenting  for screening colonoscopy due to positive cologuard. Asymptomatic. Avg risk    Patient does not think he took his Eliquis yesterday but he is not sure.    Past Medical History  Past Medical History:   Diagnosis Date    Bell's palsy     CMT (Charcot-My-Tooth disease)     DM (diabetes mellitus), type 2 03/10/2022    GERD (gastroesophageal reflux disease)     History of corneal abrasion 2017    History of epididymitis 2013    HTN (hypertension)     Hypothyroid     Migraines     BRAXTON (obstructive sleep apnea)     Polyneuropathy     Retention of urine, unspecified 2013    Incomplete emptying of bladder     Surgical History  Past Surgical History:   Procedure Laterality Date    CERVICAL LAMINECTOMY  2013    Laminectomy Cervical    DENTAL SURGERY  09/10/2015    FASCIOTOMY  2013    FOOT     Social History  He reports that he has never smoked. He has never used smokeless tobacco. He reports that he does not drink alcohol and does not use drugs.    Family History  Family History   Problem Relation Name Age of Onset    Charcot-My-Tooth disease Mother      Breast cancer Mother           at age 37 of breast cancer    Other (Cardiac disorder) Father      Hypertension Father      Diabetes Father          Type II    Colon cancer Maternal Grandfather      Charcot-My-Tooth disease Sibling      Asthma Other      Diabetes Other      Hypertension Other          Allergies  No Known Allergies  Review of Systems   Constitutional:  Negative for chills, fever and unexpected weight change.   HENT:  Negative for trouble swallowing.    Respiratory:  Negative for shortness of breath.    Cardiovascular:  Negative for chest pain.   Gastrointestinal:  Negative for abdominal distention, abdominal pain, anal bleeding, blood in stool, constipation, diarrhea, nausea, rectal pain and vomiting.   Skin:  Negative for color change.       "  Physical Exam  Constitutional:       General: He is not in acute distress.     Appearance: Normal appearance. He is not toxic-appearing.   HENT:      Head: Normocephalic.      Nose: Nose normal.   Eyes:      General: No scleral icterus.     Pupils: Pupils are equal, round, and reactive to light.   Cardiovascular:      Rate and Rhythm: Normal rate.   Pulmonary:      Effort: Pulmonary effort is normal. No respiratory distress.      Breath sounds: No wheezing.   Abdominal:      General: Abdomen is flat. Bowel sounds are normal. There is no distension.      Palpations: Abdomen is soft.      Tenderness: There is no abdominal tenderness. There is no guarding or rebound.   Skin:     General: Skin is warm.      Coloration: Skin is not jaundiced.   Neurological:      General: No focal deficit present.      Mental Status: He is alert.   Psychiatric:         Mood and Affect: Mood normal.          Last Recorded Vitals  Blood pressure (!) 178/106, pulse 85, temperature 36 °C (96.8 °F), resp. rate 17, height 1.753 m (5' 9\"), weight 90 kg (198 lb 6.6 oz), SpO2 94%.    Assessment/Plan   Encounter for Screening Colonoscopy/Positive Cologuard  - proceed w colonoscopy      Vivienne Desouza MD  "

## 2025-01-16 NOTE — ANESTHESIA POSTPROCEDURE EVALUATION
Patient: Morris Aquino    Procedure Summary       Date: 01/16/25 Room / Location: [facility] OR    Anesthesia Start: 1251 Anesthesia Stop: 1346    Procedure: COLONOSCOPY Diagnosis:       Screen for colon cancer      Positive colorectal cancer screening using Cologuard test    Scheduled Providers: Vivienne Desouza MD; David Garibay DO Responsible Provider: David Garibay DO    Anesthesia Type: MAC ASA Status: 3            Anesthesia Type: MAC    Vitals Value Taken Time   /78 01/16/25 1435   Temp 36.2 °C (97.2 °F) 01/16/25 1346   Pulse 71 01/16/25 1435   Resp 16 01/16/25 1435   SpO2 95 % 01/16/25 1435       Anesthesia Post Evaluation    Patient location during evaluation: PACU  Patient participation: complete - patient participated  Level of consciousness: awake and alert  Pain score: 0  Pain management: adequate  Multimodal analgesia pain management approach  Airway patency: patent  Two or more strategies used to mitigate risk of obstructive sleep apnea  Cardiovascular status: acceptable  Respiratory status: acceptable  Hydration status: acceptable  Postoperative Nausea and Vomiting: none        No notable events documented.

## 2025-01-16 NOTE — ANESTHESIA PREPROCEDURE EVALUATION
"Patient: Morris Aquino    Procedure Information       Date/Time: 01/16/25 1240    Scheduled providers: Vivienne Desouza MD; David Garibay DO    Procedure: COLONOSCOPY    Location: [facility] OR        HPI: 64 year old male presenting for screening colonoscopy after positive cologuard. History significant for HTN, a flutter on eliquis, T2DM, depression, BPH.    Reports taking eliquis in the last 24 hours. Did not take metoprolol today    Denies recent cough, cold, runny nose, fever, flu like symptoms. No exertional angina nor exertional dyspnea. No orthopnea, paroxysmal nocturnal dyspnea, leg swelling. Denies palpitations. No issues with bleeding nor blood clots.    Anesthesia history: no issues    Visit Vitals  BP (!) 178/106   Pulse 85   Temp 36 °C (96.8 °F)   Resp 17   Ht 1.753 m (5' 9\")   Wt 90 kg (198 lb 6.6 oz)   SpO2 94%   BMI 29.30 kg/m²   Smoking Status Never   BSA 2.09 m²        [unfilled]        Transthoracic Echo (TTE) Complete 11/19/2024   CONCLUSIONS:   1. The left ventricular systolic function is normal, with a visually estimated ejection fraction of 55-60%.   2. No left ventricular thrombus visualized.   3. There is normal right ventricular global systolic function.    Encounter Date: 11/18/24   Electrocardiogram, 12-lead PRN ACS symptoms   Result Value    Ventricular Rate 153    Atrial Rate 306    QRS Duration 70    QT Interval 270    QTC Calculation(Bazett) 431    P Axis 81    R Axis 7    T Axis 36    QRS Count 25    Q Onset 228    P Onset 173    P Offset 205    T Offset 363    QTC Fredericia 369    Narrative    Atrial flutter with 2:1 AV conduction  Nonspecific ST abnormality  Abnormal ECG  When compared with ECG of 24-JUN-2024 13:12, (unconfirmed)  Atrial flutter has replaced Sinus rhythm  Vent. rate has increased BY  85 BPM  See ED provider note for full interpretation and clinical correlation  Confirmed by Sunni Curry ([id number]) on 11/19/2024 3:26:47 PM        Stress " test: none in emr      Relevant Problems   Cardiac   (+) Benign essential hypertension   (+) Hypercholesteremia   (+) Paroxysmal atrial fibrillation (Multi)   (+) Typical atrial flutter (Multi)      Neuro   (+) Bell's palsy   (+) Chronic depression   (+) Chronic migraine   (+) Peripheral neuropathy      GI   (+) Esophageal reflux      /Renal   (+) BPH (benign prostatic hyperplasia)      Endocrine   (+) Controlled type 2 diabetes mellitus (Multi)   (+) Hypothyroidism      Hematology   (+) Anticoagulant long-term use      Musculoskeletal   (+) Osteoarthritis, multiple sites      ID   (+) Viral syndrome      Skin   (+) Dermatitis, eczematoid       Clinical information reviewed:   Tobacco  Allergies  Meds   Med Hx  Surg Hx   Fam Hx  Soc Hx        NPO Detail:  NPO/Void Status  Carbohydrate Drink Given Prior to Surgery? : N  Date of Last Liquid: 01/15/25  Time of Last Liquid: 0700  Date of Last Solid: 01/15/25  Time of Last Solid: 1800  Last Intake Type: Clear fluids  Time of Last Void: 0015         Physical Exam    Airway  Mallampati: II  TM distance: >3 FB  Neck ROM: full     Cardiovascular   Rhythm: regular  Rate: normal     Dental - normal exam     Pulmonary   Breath sounds clear to auscultation     Abdominal   (+) obese  Abdomen: soft             Anesthesia Plan    History of general anesthesia?: yes  History of complications of general anesthesia?: no    ASA 3     MAC   (Eliquis dosing discussed with patient and proceduralist. Dr. Desouza agreeable to proceed, potentially unable to remove any polyps given AC use in last 24 hours.)  The patient is not a current smoker.    intravenous induction   Anesthetic plan and risks discussed with patient.  Use of blood products discussed with patient who consented to blood products.    Plan discussed with CRNA.

## 2025-01-21 LAB
ATRIAL RATE: 300 BPM
ATRIAL RATE: 300 BPM
ATRIAL RATE: 64 BPM
P AXIS: 254 DEGREES
P AXIS: 32 DEGREES
P AXIS: 40 DEGREES
P OFFSET: 156 MS
P OFFSET: 161 MS
P OFFSET: 218 MS
P ONSET: 163 MS
P ONSET: 92 MS
P ONSET: 97 MS
PR INTERVAL: 252 MS
Q ONSET: 223 MS
Q ONSET: 226 MS
Q ONSET: 226 MS
QRS COUNT: 10 BEATS
QRS COUNT: 15 BEATS
QRS COUNT: 22 BEATS
QRS DURATION: 74 MS
QRS DURATION: 74 MS
QRS DURATION: 82 MS
QT INTERVAL: 314 MS
QT INTERVAL: 382 MS
QT INTERVAL: 382 MS
QTC CALCULATION(BAZETT): 394 MS
QTC CALCULATION(BAZETT): 462 MS
QTC CALCULATION(BAZETT): 469 MS
QTC FREDERICIA: 390 MS
QTC FREDERICIA: 406 MS
QTC FREDERICIA: 439 MS
R AXIS: 27 DEGREES
R AXIS: 5 DEGREES
R AXIS: 5 DEGREES
T AXIS: -32 DEGREES
T AXIS: 44 DEGREES
T AXIS: 9 DEGREES
T OFFSET: 383 MS
T OFFSET: 414 MS
T OFFSET: 417 MS
VENTRICULAR RATE: 130 BPM
VENTRICULAR RATE: 64 BPM
VENTRICULAR RATE: 91 BPM

## 2025-01-22 LAB
LABORATORY COMMENT REPORT: NORMAL
PATH REPORT.FINAL DX SPEC: NORMAL
PATH REPORT.GROSS SPEC: NORMAL
PATH REPORT.RELEVANT HX SPEC: NORMAL
PATH REPORT.TOTAL CANCER: NORMAL

## 2025-01-23 ENCOUNTER — APPOINTMENT (OUTPATIENT)
Dept: CARDIOLOGY | Facility: HOSPITAL | Age: 65
End: 2025-01-23
Payer: MEDICARE

## 2025-01-30 ENCOUNTER — TELEPHONE (OUTPATIENT)
Facility: CLINIC | Age: 65
End: 2025-01-30
Payer: MEDICARE

## 2025-01-30 ENCOUNTER — OFFICE VISIT (OUTPATIENT)
Dept: CARDIOLOGY | Facility: HOSPITAL | Age: 65
End: 2025-01-30
Payer: MEDICARE

## 2025-01-30 VITALS
BODY MASS INDEX: 30.02 KG/M2 | HEART RATE: 62 BPM | OXYGEN SATURATION: 97 % | DIASTOLIC BLOOD PRESSURE: 88 MMHG | SYSTOLIC BLOOD PRESSURE: 158 MMHG | WEIGHT: 203.26 LBS

## 2025-01-30 DIAGNOSIS — D12.6 ADENOMA OF COLON: Primary | ICD-10-CM

## 2025-01-30 DIAGNOSIS — I48.3 TYPICAL ATRIAL FLUTTER (MULTI): Primary | ICD-10-CM

## 2025-01-30 PROCEDURE — 93005 ELECTROCARDIOGRAM TRACING: CPT | Performed by: NURSE PRACTITIONER

## 2025-01-30 PROCEDURE — 99215 OFFICE O/P EST HI 40 MIN: CPT | Performed by: NURSE PRACTITIONER

## 2025-01-30 PROCEDURE — 3079F DIAST BP 80-89 MM HG: CPT | Performed by: NURSE PRACTITIONER

## 2025-01-30 PROCEDURE — 1159F MED LIST DOCD IN RCRD: CPT | Performed by: NURSE PRACTITIONER

## 2025-01-30 PROCEDURE — 1123F ACP DISCUSS/DSCN MKR DOCD: CPT | Performed by: NURSE PRACTITIONER

## 2025-01-30 PROCEDURE — 3077F SYST BP >= 140 MM HG: CPT | Performed by: NURSE PRACTITIONER

## 2025-01-30 PROCEDURE — 1160F RVW MEDS BY RX/DR IN RCRD: CPT | Performed by: NURSE PRACTITIONER

## 2025-01-30 PROCEDURE — 93010 ELECTROCARDIOGRAM REPORT: CPT | Performed by: STUDENT IN AN ORGANIZED HEALTH CARE EDUCATION/TRAINING PROGRAM

## 2025-01-30 PROCEDURE — 1036F TOBACCO NON-USER: CPT | Performed by: NURSE PRACTITIONER

## 2025-01-30 NOTE — TELEPHONE ENCOUNTER
Called patient and discussed results of colonoscopy. All polyps removed were tubular adenomas, benign but potentially pre-cancerous polyps. The polyp on his IC valve is also an adenoma so needs to be removed. Recommend rpt colonoscopy in 6 mo for polypectomy.  Patient is agreeable to repeating. Needs to arrange transportation again as he does not have someone to take him. Will involve PMD

## 2025-01-31 LAB
ATRIAL RATE: 63 BPM
P AXIS: 39 DEGREES
P OFFSET: 161 MS
P ONSET: 97 MS
PR INTERVAL: 248 MS
Q ONSET: 221 MS
QRS COUNT: 11 BEATS
QRS DURATION: 88 MS
QT INTERVAL: 380 MS
QTC CALCULATION(BAZETT): 388 MS
QTC FREDERICIA: 386 MS
R AXIS: 15 DEGREES
T AXIS: 42 DEGREES
T OFFSET: 411 MS
VENTRICULAR RATE: 63 BPM

## 2025-02-05 ASSESSMENT — ENCOUNTER SYMPTOMS
DIAPHORESIS: 0
BLURRED VISION: 0
NEAR-SYNCOPE: 0
VOMITING: 0
SYNCOPE: 0
FALLS: 0
DIZZINESS: 0
DYSPNEA ON EXERTION: 0
MYALGIAS: 0
LIGHT-HEADEDNESS: 0
SHORTNESS OF BREATH: 0
HEADACHES: 0
COUGH: 0
SNORING: 0
WEAKNESS: 0
ABDOMINAL PAIN: 0
HEMOPTYSIS: 0
PND: 0
FEVER: 0
NAUSEA: 0
DOUBLE VISION: 0
PALPITATIONS: 0
ORTHOPNEA: 0
SPUTUM PRODUCTION: 0
IRREGULAR HEARTBEAT: 0
SORE THROAT: 0
DIARRHEA: 0

## 2025-02-06 NOTE — PROGRESS NOTES
Subjective   Morris Aquino is a 64 y.o. male.    Chief Complaint:  Hospital Follow-up    Morris Aquino is a 64 y.o. male PMH of Charcot-My-Tooth disease, DM, BRAXTON on CPAP, referred to EP due to atrial flutter.  Patient had an episode of typical atrial flutter on October 2 2023. He then had another hospitalization 11/2024 for recurrent atrial flutter.  He had a normal echo in May 2024, with normal RA and LA size.    ECG 1/30/2025 NSR with 1st degree AVB HR 63 bpm,  ms    Patient was originally scheduled for ablation 9/2024, but canceled the procedure because he did not have anyone to drive him. He felt like there was a lack of communication regarding the requirements of the procedure and presents for a second opinion regarding need for ablation.    /88   Pulse 62   Wt 92.2 kg (203 lb 4.2 oz)   SpO2 97%   BMI 30.02 kg/m²   Current Outpatient Medications on File Prior to Visit   Medication Sig Dispense Refill    apixaban (Eliquis) 5 mg tablet Take 1 tablet (5 mg) by mouth 2 times a day. 180 tablet 3    atorvastatin (Lipitor) 80 mg tablet TAKE 1 TABLET AT BEDTIME 90 tablet 3    blood sugar diagnostic (Blood Glucose Test) strip 1 each once daily. TEST ONCE DAILY AS DIRECTED DX E11.9 100 each 3    cetirizine (ZyrTEC) 10 mg tablet Take 1 tablet (10 mg) by mouth once daily.      cholecalciferol (Vitamin D3) 50 MCG (2000 UT) tablet Take 1 tablet (50 mcg) by mouth once daily.      LANCETS MISC 28G. Test once daily - DX E 11.9      levothyroxine (Synthroid, Levoxyl) 100 mcg tablet TAKE 1 TABLET DAILY 90 tablet 3    metFORMIN (Glucophage) 500 mg tablet TAKE 2 TABLETS TWICE A DAY WITH MORNING AND EVENING MEALS 360 tablet 3    metoprolol succinate XL (Toprol-XL) 25 mg 24 hr tablet Take 1 tablet (25 mg) by mouth twice a day. Do not crush or chew. 180 tablet 3    nabumetone (Relafen) 750 mg tablet Take 1 tablet (750 mg) by mouth once daily as needed for mild pain (1 - 3).      omeprazole (PriLOSEC) 20 mg   capsule Take 1 capsule (20 mg) by mouth once daily.      pregabalin (Lyrica) 75 mg capsule TAKE 1 CAPSULE THREE TIMES A  capsule 1    tamsulosin (Flomax) 0.4 mg 24 hr capsule TAKE 1 CAPSULE ONCE DAILY 30 MINUTES FOLLOWING THE SAME MEAL DAILY 90 capsule 3    traMADol (Ultram) 50 mg tablet Take 1 tablet (50 mg) by mouth every 6 hours if needed for severe pain (7 - 10). 360 tablet 0    Trintellix 20 mg tablet tablet Take 1 tablet (20 mg) by mouth once daily. 90 tablet 3    [DISCONTINUED] clobetasol (Temovate) 0.05 % cream APPLY SPARINGLY TO AFFECTED AREA(S) TWICE DAILY (Patient not taking: Reported on 1/16/2025)       No current facility-administered medications on file prior to visit.         Review of Systems   Constitutional: Negative for diaphoresis, fever and malaise/fatigue.   HENT:  Negative for congestion and sore throat.    Eyes:  Negative for blurred vision and double vision.   Cardiovascular:  Negative for chest pain, dyspnea on exertion, irregular heartbeat, leg swelling, near-syncope, orthopnea, palpitations, paroxysmal nocturnal dyspnea and syncope.   Respiratory:  Negative for cough, hemoptysis, shortness of breath, snoring and sputum production.    Hematologic/Lymphatic: Negative for bleeding problem.   Skin:  Negative for rash.   Musculoskeletal:  Negative for falls, joint pain and myalgias.   Gastrointestinal:  Negative for abdominal pain, diarrhea, nausea and vomiting.   Neurological:  Negative for dizziness, headaches, light-headedness and weakness.   All other systems reviewed and are negative.      Objective   Constitutional:       Appearance: Healthy appearance. Not in distress.   Eyes:      Conjunctiva/sclera: Conjunctivae normal.   HENT:      Nose: Nose normal.    Mouth/Throat:      Pharynx: Oropharynx is clear.   Pulmonary:      Effort: Pulmonary effort is normal.      Breath sounds: Normal breath sounds. No wheezing. No rhonchi.   Chest:      Chest wall: Not tender to palpatation.    Cardiovascular:      Normal rate. Regular rhythm.      Murmurs: There is no murmur.      No rub.   Pulses:     Intact distal pulses.   Edema:     Peripheral edema absent.   Abdominal:      General: Bowel sounds are normal.      Palpations: Abdomen is soft.   Musculoskeletal: Normal range of motion.      Cervical back: Neck supple. Skin:     General: Skin is warm and dry.   Neurological:      Mental Status: Alert and oriented to person, place and time.      Motor: Motor function is intact.         Lab Review:   Lab Results   Component Value Date     11/19/2024    K 4.1 11/19/2024     11/19/2024    CO2 27 11/19/2024    BUN 15 11/19/2024    CREATININE 1.08 11/19/2024    GLUCOSE 159 (H) 11/19/2024    CALCIUM 9.0 11/19/2024     Lab Results   Component Value Date    WBC 11.9 (H) 11/19/2024    HGB 15.6 11/19/2024    HCT 45.2 11/19/2024    MCV 84 11/19/2024     11/19/2024     I reviewed his most recent hospitalization notes, cardiac testing, and lab work.    Assessment/Plan   The encounter diagnosis was Typical atrial flutter (Multi).  Patient would like to discuss need for ablation.  Patient provided education regarding atrial fibrillation.  Atrial fibrillation/Atrial Flutter is the #1 cause of stroke, and this can be mitigated with anticoagulation therapy.  Atrial flutter is also progressive over time and can eventually lead to heart failure.    Continue Eliquis and metoprolol  Given where he lives, we will refer him to Dr. Roper in the [place] area for potential atrial flutter ablation.  Patient educated that he cannot drive himself within 24 hours of anesthesia.  This can be potentially mitigated if he stays overnight in the hospital.  Patient is going to consider this, he's concerned about his pets at home alone.    Reviewed with Dr. Michaels

## 2025-02-10 ENCOUNTER — TELEPHONE (OUTPATIENT)
Dept: CARDIOLOGY | Facility: HOSPITAL | Age: 65
End: 2025-02-10
Payer: MEDICARE

## 2025-02-10 NOTE — TELEPHONE ENCOUNTER
Called and spoke to pt regarding referral to Dr. Roper. Pt states he would like to switch to new EP to discuss ablation but is struggling because he does not have transportation to/from if we were to schedule ablation. Pt states there is no public transportation near his home address and would not be able to arrange a ride. Pt would like to hold off on scheduling an appt with Dr. Roper until he can figure out transportation. Pt states he is scheduled to see his PCP in March and will discuss options with her.

## 2025-02-19 ENCOUNTER — PATIENT OUTREACH (OUTPATIENT)
Dept: PRIMARY CARE | Facility: CLINIC | Age: 65
End: 2025-02-19
Payer: MEDICARE

## 2025-02-19 NOTE — PROGRESS NOTES
Unable to reach patient for wrap up of Transitional Care Management (TCM) program. LVM with patient to return call for questions or concerns. The patient has met the target of no readmission for (90) days post hospital discharge and is graduated from the TCM program at this time.

## 2025-03-04 ENCOUNTER — TELEPHONE (OUTPATIENT)
Dept: PRIMARY CARE | Facility: CLINIC | Age: 65
End: 2025-03-04
Payer: MEDICARE

## 2025-03-04 NOTE — TELEPHONE ENCOUNTER
I need to talk to you dr. Marvin about scheduling my oblation and my colonoscopy.  It will more than likely be two separate hospitals and I need to figure out which one needs to be done first.

## 2025-03-05 NOTE — TELEPHONE ENCOUNTER
Called pt  -      Discussed getting in with cardiology is important - he will try to get an appt with the doctor in [place] -     And when they set up procedure if he needs one  -   Then he will reach out to No

## 2025-03-11 ENCOUNTER — TELEPHONE (OUTPATIENT)
Dept: CARDIOLOGY | Facility: CLINIC | Age: 65
End: 2025-03-11
Payer: MEDICARE

## 2025-03-14 PROCEDURE — RXMED WILLOW AMBULATORY MEDICATION CHARGE

## 2025-03-15 ENCOUNTER — PHARMACY VISIT (OUTPATIENT)
Dept: PHARMACY | Facility: CLINIC | Age: 65
End: 2025-03-15
Payer: COMMERCIAL

## 2025-03-18 ENCOUNTER — APPOINTMENT (OUTPATIENT)
Dept: CARDIOLOGY | Facility: HOSPITAL | Age: 65
End: 2025-03-18
Payer: MEDICARE

## 2025-03-21 DIAGNOSIS — N40.0 BENIGN PROSTATIC HYPERPLASIA WITHOUT LOWER URINARY TRACT SYMPTOMS: ICD-10-CM

## 2025-03-23 RX ORDER — TAMSULOSIN HYDROCHLORIDE 0.4 MG/1
CAPSULE ORAL
Qty: 90 CAPSULE | Refills: 3 | Status: SHIPPED | OUTPATIENT
Start: 2025-03-23

## 2025-03-24 DIAGNOSIS — G60.0 CMT (CHARCOT-MARIE-TOOTH DISEASE): ICD-10-CM

## 2025-03-24 DIAGNOSIS — M15.0 PRIMARY OSTEOARTHRITIS INVOLVING MULTIPLE JOINTS: ICD-10-CM

## 2025-03-24 DIAGNOSIS — G62.89 OTHER POLYNEUROPATHY: ICD-10-CM

## 2025-03-24 RX ORDER — TRAMADOL HYDROCHLORIDE 50 MG/1
50 TABLET ORAL EVERY 6 HOURS PRN
Qty: 360 TABLET | Refills: 0 | Status: SHIPPED | OUTPATIENT
Start: 2025-03-24

## 2025-03-27 ENCOUNTER — TELEPHONE (OUTPATIENT)
Dept: CARDIOLOGY | Facility: HOSPITAL | Age: 65
End: 2025-03-27
Payer: MEDICARE

## 2025-03-27 DIAGNOSIS — I48.3 TYPICAL ATRIAL FLUTTER (MULTI): Primary | ICD-10-CM

## 2025-03-27 NOTE — TELEPHONE ENCOUNTER
Received message from Dr Kaminski that patient wanted to schedule AFL ablation at [place] due to location. I talked with patient and he would like an appt to further discuss procedure. Office visit set up for next month.

## 2025-03-28 ENCOUNTER — APPOINTMENT (OUTPATIENT)
Dept: PRIMARY CARE | Facility: CLINIC | Age: 65
End: 2025-03-28
Payer: MEDICARE

## 2025-03-28 VITALS
BODY MASS INDEX: 29.83 KG/M2 | DIASTOLIC BLOOD PRESSURE: 82 MMHG | SYSTOLIC BLOOD PRESSURE: 124 MMHG | WEIGHT: 202 LBS | OXYGEN SATURATION: 97 % | HEART RATE: 86 BPM

## 2025-03-28 DIAGNOSIS — E11.8 CONTROLLED TYPE 2 DIABETES MELLITUS WITH COMPLICATION, WITHOUT LONG-TERM CURRENT USE OF INSULIN (MULTI): Primary | ICD-10-CM

## 2025-03-28 DIAGNOSIS — G47.33 OBSTRUCTIVE SLEEP APNEA: ICD-10-CM

## 2025-03-28 DIAGNOSIS — G89.29 CHRONIC INTRACTABLE PAIN: ICD-10-CM

## 2025-03-28 DIAGNOSIS — I48.92 PAROXYSMAL ATRIAL FLUTTER (MULTI): ICD-10-CM

## 2025-03-28 DIAGNOSIS — D12.6 ADENOMATOUS POLYP OF COLON, UNSPECIFIED PART OF COLON: ICD-10-CM

## 2025-03-28 DIAGNOSIS — I10 BENIGN ESSENTIAL HYPERTENSION: ICD-10-CM

## 2025-03-28 DIAGNOSIS — F32.A CHRONIC DEPRESSION: ICD-10-CM

## 2025-03-28 DIAGNOSIS — E03.9 HYPOTHYROIDISM, UNSPECIFIED TYPE: ICD-10-CM

## 2025-03-28 PROCEDURE — 99214 OFFICE O/P EST MOD 30 MIN: CPT | Performed by: FAMILY MEDICINE

## 2025-03-28 PROCEDURE — 3079F DIAST BP 80-89 MM HG: CPT | Performed by: FAMILY MEDICINE

## 2025-03-28 PROCEDURE — 3074F SYST BP LT 130 MM HG: CPT | Performed by: FAMILY MEDICINE

## 2025-03-28 PROCEDURE — 1159F MED LIST DOCD IN RCRD: CPT | Performed by: FAMILY MEDICINE

## 2025-03-28 PROCEDURE — 1160F RVW MEDS BY RX/DR IN RCRD: CPT | Performed by: FAMILY MEDICINE

## 2025-03-28 PROCEDURE — 1123F ACP DISCUSS/DSCN MKR DOCD: CPT | Performed by: FAMILY MEDICINE

## 2025-03-28 PROCEDURE — G2211 COMPLEX E/M VISIT ADD ON: HCPCS | Performed by: FAMILY MEDICINE

## 2025-03-28 ASSESSMENT — PATIENT HEALTH QUESTIONNAIRE - PHQ9
1. LITTLE INTEREST OR PLEASURE IN DOING THINGS: SEVERAL DAYS
SUM OF ALL RESPONSES TO PHQ9 QUESTIONS 1 AND 2: 2
2. FEELING DOWN, DEPRESSED OR HOPELESS: SEVERAL DAYS

## 2025-03-28 NOTE — PATIENT INSTRUCTIONS
"No change in meds today       Appt 3 months  - Medicare wellness and med check        I will have your test results on your secureach card within a week.    If I don't, please call and leave me a message that they were not there.  I may have not seen them.       To call for your test results,  the secureach phone number on the card is    [phone]  You also need your Mailbox ID number and your Pin number which are on your card.   If you need assistance using the card or if you have lost it, call the [employer] help number at   [phone]       Diabetic  Patient Reminders:    It is very important for you to have a regular exercise routine - at least 30 minutes 5 days a week.    It is important to inspect your feet regularly, as diabetics often get numbness of their feet and then cannot feel wounds.    It is generally recommended for diabetics to take one baby aspirin daily. Be sure to clarify this with me if I have not told you to do this.    It is very important for you to get a yearly flu shot and a PREVNAR 20 vaccine to help prevent pneumonia.    Please consider getting vaccinated against COVID-19 as well.        Watching your nutrition is VERY important  - always! You want to be eating a diet low in sodium (5339-8165 mg a day); low in starches and sweets; plenty of fresh fruits and veggies (the fresher the better); whole grains; lean meats and dairy that are low in saturated and trans fats.    Watch the amounts you eat as well - pay attention to serving size.    It is important for diabetics to see their eye doctor as least once year, and their dentist every 6 months.         For General Healthy Nutrition    (Remember - NOT A DIET!   Diets are only good for class reunions.)    These are my general good nutrition recommendations for most people.   I use the term \" diet \"  in these instructions to mean your overall nutrition - how you eat and drink.   If we talked about something different during your visit " with me,  other than what is written below,  follow that advice instead.       For most people,  eating healthier means getting less added sugar and less processed foods in your diet    The fresher the better.    Added sugar is now a part of the nutrition label on manufactured food, so you can keep an eye on it easier.    But basically,  foods and beverages  that contain regular sugar and corn syrup are the main sources of added sugars.  Eating as little of these foods as you can is best.   One shocking example of the epidemic of added sugar is soda.    One can of regular soda contains about as much added sugar as 3 regular size doughnuts!     The other issue with processed foods is the amount of processed grains they contain , such as white flour.    This is also something you want to try to limit in your diet.     But, grain products are very important for your nutrition.    Whole grains are better for your body.     Cutting back on white breads, traditional pasta, baked goods, white rice,  and processed cereals will be healthier for you.   The better choices include whole grain breads,  whole wheat pasta,  brown rice, quinoa, barley, steel cut  or rolled oats.   If you eat cereal for breakfast, try to look for one made with whole grains and less sugar.   There are many people who have a problem with gluten, for a large variety of reasons.    Generally,  products made with wheat flour , barley or rye are the primary source of gluten.       Cutting back on saturated fats is important.    You want the majority of the meat that you eat to be chicken, fish or turkey.   Baked or broiled is best -  fried adds too much fat.    There are healthy fats that are important - fat is important for holding down appetite, vitamin absorption and several metabolic processes in the body.  Monounsaturated fats raise HDL (good cholesterol) and lower LDL (bad cholesterol).   Olive oil, peanut oil, nuts, seeds, and avocados are great  "sources of the good fats.       Ideas are:   Trade sour cream dip for hummus (which is rich in olive oil) or guacamole; use veggies or whole-wheat chips to dip.    Nuts are an excellent source of protein and healthy fats.   Tree nuts are the best kind, such as almonds or walnuts.   Just be careful - they are high in calories, so stick to a serving size.  (Most are about 200 calories for a 1/4 cup)      Proteins are very important for your body, and they also hold down your appetite.   Try to have protein with every meal.    These generally are meats, nuts, many beans, legumes, eggs, and dairy.   You will find protein in whole grain products and some green vegetables have a little too.     When you have dairy (if you can - many people are lactose intolerant) try to make it low fat.    Ideas are 1% milk, lowfat yogurt or cheeses, low fat cottage cheese.   I don't generally recommend FAT FREE because they often contain artificial products to improve taste, and the fat helps hold down your appetite.   If you are lactose intolerant, try to see if taking Lactaid before having dairy helps.      Fresh fruits and vegetables are VERY important.  The brighter the better.   Many vegetables are considered \"Free Foods\" - meaning you can eat as much as you want, and it does not matter.  These include tomatoes, cucumbers, celery, peppers, all the various lettuces and kale - to name a few.   Potatoes, corn and peas are starchy, so do have more calories, but are still healthy - you just want to watch the amount of them you eat.       Fruits are full of wonderful nutrition.   They contain natural sugar called fructose, so eating them in moderation is best.   Diabetics may need to pay careful attention to how their body reacts to the sugar.  Some fruits might drastically increase their blood sugar.      Eating smaller meals with a couple of small snacks is better for your metabolism than not eating for long amounts of time  (breakfast " is very important).   Trying to avoid large meals is helpful too.    Eating like this helps keep your appetite down and keeps you in burning metabolism rather than storage metabolism so your body will use the calories you eat.       I do not tell people to stop eating sweets or snack foods - just limit the amounts you have.  The less the better.   Pay attention to serving sizes, and treat them as a treat.        Foods like doughnuts, pop tarts, sugar cereals, cookies  ARE NOT GOOD FOR BREAKFAST.   They are loaded with sugar and will cause you to be hungrier in the day and often not feel well.    Caffeine needs to be limited - no more than 2 servings a day.  Some people can't have any at all.    (if you have any sleep or anxiety issues - stop the caffeine)   Coffee, many teas, many sodas, energy drinks, almost any diet supplement,  and chocolate all contain caffeine.      Water is important.   For most people, 8   x  8 ounces  a day are needed.  This may vary for some health issues.    If you need to be on a low sodium diet, that means looking at labels and eating only 1000 - 2000 mg of sodium a day.    Calcium intake is important.  3 servings of a high calcium food or drink a day is recommended.   This is usually a cup of milk, a cup of yogurt, an ounce of a hard cheese or 1.5 ounces of a soft cheese are the usual servings.   There are other high calcium foods - including soy or almond milk, broccoli,  almonds, dark green leafy vegetable.   Make sure you are not getting more than 1000  - 1200 mg of total calcium a day (unless you have been told you need more by a doctor).    Vitamin D 3 is important to absorb the calcium and for your immune system.   For children, 400 IU a day is recommended.   For adults - 800 - 5000 IU a day  is recommended.  (Often the amount needed is individualized for adults - be sure to ask how much is right for you)    Physical activity is very important for good health.    Finding  activities that give you regular exercise is very important for good health.  Try to find exercise you enjoy doing on a regular basis.    30 minutes at least 5 days a week of a good cardiovascular exercise is recommended.   That means something that gets your heart rate going faster than your usual baseline and you can find yourself breathing harder than usual while you are exercising.  If you have not done any exercise in a long time,  make sure you ask if its safe for you to start,  and be sure to gradually work up to your goal.      If you need to lose weight,  following these recommendations will help you.   And if you are doing all of this and still not losing weight, then its likely just the amount of food you are eating.   Learn to cut back on portion sizes.  Using smaller plates may help.  Healthy weight loss is  only about a pound a week.   You have to remember that whatever you do to lose the weight, you must be prepared to keep it up for life for the weight to stay off.     A lot of people have a lot of luck with using something like a fit bit,  or a program where you keep track of all of your calories that you eat and what you burn off in the day.
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
## 2025-03-29 LAB
ALBUMIN SERPL-MCNC: 4.2 G/DL (ref 3.6–5.1)
ALP SERPL-CCNC: 77 U/L (ref 35–144)
ALT SERPL-CCNC: 31 U/L (ref 9–46)
ANION GAP SERPL CALCULATED.4IONS-SCNC: 8 MMOL/L (CALC) (ref 7–17)
AST SERPL-CCNC: 23 U/L (ref 10–35)
BASOPHILS # BLD AUTO: 29 CELLS/UL (ref 0–200)
BASOPHILS NFR BLD AUTO: 0.4 %
BILIRUB SERPL-MCNC: 0.7 MG/DL (ref 0.2–1.2)
BUN SERPL-MCNC: 15 MG/DL (ref 7–25)
CALCIUM SERPL-MCNC: 8.9 MG/DL (ref 8.6–10.3)
CHLORIDE SERPL-SCNC: 101 MMOL/L (ref 98–110)
CO2 SERPL-SCNC: 27 MMOL/L (ref 20–32)
CREAT SERPL-MCNC: 0.94 MG/DL (ref 0.7–1.35)
EGFRCR SERPLBLD CKD-EPI 2021: 89 ML/MIN/1.73M2
EOSINOPHIL # BLD AUTO: 202 CELLS/UL (ref 15–500)
EOSINOPHIL NFR BLD AUTO: 2.8 %
ERYTHROCYTE [DISTWIDTH] IN BLOOD BY AUTOMATED COUNT: 12.9 % (ref 11–15)
EST. AVERAGE GLUCOSE BLD GHB EST-MCNC: 160 MG/DL
EST. AVERAGE GLUCOSE BLD GHB EST-SCNC: 8.9 MMOL/L
GLUCOSE SERPL-MCNC: 138 MG/DL (ref 65–99)
HBA1C MFR BLD: 7.2 % OF TOTAL HGB
HCT VFR BLD AUTO: 43.4 % (ref 38.5–50)
HGB BLD-MCNC: 14.5 G/DL (ref 13.2–17.1)
LYMPHOCYTES # BLD AUTO: 2009 CELLS/UL (ref 850–3900)
LYMPHOCYTES NFR BLD AUTO: 27.9 %
MCH RBC QN AUTO: 29.2 PG (ref 27–33)
MCHC RBC AUTO-ENTMCNC: 33.4 G/DL (ref 32–36)
MCV RBC AUTO: 87.3 FL (ref 80–100)
MONOCYTES # BLD AUTO: 432 CELLS/UL (ref 200–950)
MONOCYTES NFR BLD AUTO: 6 %
NEUTROPHILS # BLD AUTO: 4529 CELLS/UL (ref 1500–7800)
NEUTROPHILS NFR BLD AUTO: 62.9 %
PLATELET # BLD AUTO: 190 THOUSAND/UL (ref 140–400)
PMV BLD REES-ECKER: 9.7 FL (ref 7.5–12.5)
POTASSIUM SERPL-SCNC: 5 MMOL/L (ref 3.5–5.3)
PROT SERPL-MCNC: 6.6 G/DL (ref 6.1–8.1)
RBC # BLD AUTO: 4.97 MILLION/UL (ref 4.2–5.8)
SODIUM SERPL-SCNC: 136 MMOL/L (ref 135–146)
WBC # BLD AUTO: 7.2 THOUSAND/UL (ref 3.8–10.8)

## 2025-03-30 PROBLEM — B34.9 VIRAL SYNDROME: Status: RESOLVED | Noted: 2023-01-27 | Resolved: 2025-03-30

## 2025-03-30 PROBLEM — R63.1 POLYDIPSIA: Status: RESOLVED | Noted: 2023-01-27 | Resolved: 2025-03-30

## 2025-03-31 NOTE — ASSESSMENT & PLAN NOTE
Intol of CPAP -   Consider sleep specialist in the future -   He may be willing to go now that he has Medicare

## 2025-04-06 NOTE — PROGRESS NOTES
Electrophysiology Follow up Visit    History of Present Illness:  Morris Aquino is a 65 y.o. year old male patient with past medical history of atrial flutter, diabetes, BRAXTON, and Charcot-My-Tooth disease.    Patient was referred to electrophysiology due to typical atrial flutter.  He experienced an episode of atrial flutter in 2023.  He again had recurrence of atrial flutter and was hospitalized in 2024.  Echocardiogram May 2024 showed normal LV function and normal left atrium.  He was originally scheduled for ablation in 2024 but had to cancel the procedure due to transportation.  He has been continued on OAC due to elevated [id number] score.    Patient here for follow-up for atrial flutter and to discuss possible ablation. He is feeling well with no palpitations. He does noticed significant fatigue and some lightheadedness with position change since metoprolol was increased in November. He is compliant with daily medications and denies any bleeding concerns on OAC.     Medical History:  He has a past medical history of Bell's palsy, CMT (Charcot-My-Tooth disease), DM (diabetes mellitus), type 2 (03/10/2022), GERD (gastroesophageal reflux disease), History of corneal abrasion (2017), History of epididymitis (2013), HTN (hypertension), Hypothyroid, Migraines, BRAXTON (obstructive sleep apnea), Polyneuropathy, and Retention of urine, unspecified (2013).    Surgical History:  He has a past surgical history that includes Dental surgery (09/10/2015); Cervical laminectomy (2013); and Fasciotomy (2013).     Social History:  He reports that he has never smoked. He has never used smokeless tobacco. He reports that he does not drink alcohol and does not use drugs.         Family History   Problem Relation Name Age of Onset    Charcot-My-Tooth disease Mother      Breast cancer Mother           at age 37 of breast cancer    Other (Cardiac disorder) Father       Hypertension Father      Diabetes Father          Type II    Colon cancer Maternal Grandfather      Charcot-My-Tooth disease Sibling      Asthma Other      Diabetes Other      Hypertension Other          ROS:  A 14 point review of systems was done and is negative other than as stated in HPI    Physical Exam  Constitutional:       Appearance: Normal appearance.   Eyes:      Pupils: Pupils are equal, round, and reactive to light.   Cardiovascular:      Rate and Rhythm: Normal rate and regular rhythm.      Heart sounds: Normal heart sounds.   Pulmonary:      Effort: Pulmonary effort is normal.      Breath sounds: Normal breath sounds.   Musculoskeletal:         General: Normal range of motion.   Skin:     General: Skin is warm and dry.   Neurological:      Mental Status: He is alert and oriented to person, place, and time.       Allergies:  Patient has no known allergies.    Outpatient Medications:  Current Outpatient Medications   Medication Instructions    atorvastatin (LIPITOR) 80 mg, oral, Nightly    blood sugar diagnostic (Blood Glucose Test) strip 1 each, miscellaneous, Daily, TEST ONCE DAILY AS DIRECTED DX E11.9    cetirizine (ZyrTEC) 10 mg tablet 1 tablet, Daily    cholecalciferol (VITAMIN D3) 50 mcg, oral, Daily    Eliquis 5 mg, oral, 2 times daily    LANCETS MISC 28G. Test once daily - DX E 11.9    levothyroxine (SYNTHROID, LEVOXYL) 100 mcg, oral, Daily    metFORMIN (Glucophage) 500 mg tablet TAKE 2 TABLETS TWICE A DAY WITH MORNING AND EVENING MEALS    metoprolol succinate XL (TOPROL-XL) 25 mg, oral, Twice a day (morning and mid-day), Do not crush or chew.    nabumetone (RELAFEN) 750 mg, oral, Daily PRN    omeprazole (PriLOSEC) 20 mg DR capsule 1 capsule, Daily    pregabalin (Lyrica) 75 mg capsule TAKE 1 CAPSULE THREE TIMES A DAY    tamsulosin (Flomax) 0.4 mg 24 hr capsule TAKE 1 CAPSULE ONCE DAILY 30 MINUTES FOLLOWING THE SAME MEAL DAILY    traMADol (ULTRAM) 50 mg, oral, Every 6 hours PRN    Trintellix 20  "mg, oral, Daily         Reviewed Labs:  CBC  Lab Results   Component Value Date    WBC 7.2 03/28/2025    HGB 14.5 03/28/2025    HCT 43.4 03/28/2025    MCV 87.3 03/28/2025     03/28/2025        Renal Function Panel  Lab Results   Component Value Date    GLUCOSE 138 (H) 03/28/2025     03/28/2025    K 5.0 03/28/2025     03/28/2025    CO2 27 03/28/2025    ANIONGAP 8 03/28/2025    BUN 15 03/28/2025    CREATININE 0.94 03/28/2025    GFRMALE >90 06/29/2023    CALCIUM 8.9 03/28/2025        CMP  Lab Results   Component Value Date    CALCIUM 8.9 03/28/2025    PHOS 3.0 11/18/2024    PROT 6.6 03/28/2025    ALBUMIN 4.2 03/28/2025    AST 23 03/28/2025    ALT 31 03/28/2025    ALKPHOS 77 03/28/2025    BILITOT 0.7 03/28/2025        Mg   Lab Results   Component Value Date    MG 1.64 11/18/2024        Thyroid  Lab Results   Component Value Date    TSH 3.42 12/30/2024    FREET4 1.17 (H) 11/18/2024        Visit Vitals  /88   Pulse 73   Ht 1.753 m (5' 9\")   Wt 91.6 kg (202 lb)   BMI 29.83 kg/m²   Smoking Status Never   BSA 2.11 m²           Cardiac Testing Reviewed Study(s):  Echo (November/2024):   CONCLUSIONS:   1. The left ventricular systolic function is normal, with a visually estimated ejection fraction of 55-60%.   2. No left ventricular thrombus visualized.   3. There is normal right ventricular global systolic function.  Echo (May/2024):   CONCLUSIONS:   1. Left ventricular systolic function is normal with a 55-60% estimated ejection fraction.   2. There is trace-mild mitral and tricuspid regurgitation.  Monitor (May/2024):   Average heart rate 69 ()  <1% SVC burden  <1% PVC burden  Brief SVT, longest 6 beats  ECGs (reviewed and my interpretation):   4/7/2025 sinus rhythm with rate of 73 bpm      Assessment  Atrial flutter:  First diagnosed in 2023 at routine PCP office. He was started on metoprolol and OAC  He had recurrence in November 2024 and converted with IV diltiazem  November 2024 Echo " showed normal LV function 55-60%  and normal left atrium   Continue on OAC since 2023    ECG today sinus rhythm. Review of ECG shows typical atrial flutter. Patient had CTI ablation scheduled last fall though had to be cancelled due to lack of transportation. Discussed CTI ablation including risks and benefits to treat atrial flutter. Patient would like to reschedule procedure which can be done at [facility]. Reviewed the importance of continued OAC to mitigate risk of stroke. We will reduce metoprolol to 25mg daily to see if that improves fatigue. I will review with Dr Roper and our office will call to schedule procedure. We will follow up with patient 4-6 weeks after his ablation.    BRAXTON:  Compliant with CPAP    Diabetes:  Managed by PCP    Plan  Reduce metoprolol to 25mg daily  Continue Eliquis twice daily  Our office will call to schedule CTI ablation  We will follow up 4-6 weeks post ablation      Exclusive of any other services or procedures performed, Sheryl RIOS, GINA-CNP , spent 40 minutes in duration for this visit today.  This time consisted of chart review, obtaining history, and/or performing the exam as documented above as well as documenting the clinical information for the encounter in the electronic record, discussing treatment options, plans, and/or goals with patient, family, and/or caregiver, refilling medications, updating the electronic record, ordering medicines, lab work, imaging, referrals, and/or procedures as documented above and communicating with other [facility] professionals. I have discussed the results of laboratory, radiology, and cardiology studies with the patient and their family/caregiver.

## 2025-04-07 ENCOUNTER — OFFICE VISIT (OUTPATIENT)
Dept: CARDIOLOGY | Facility: HOSPITAL | Age: 65
End: 2025-04-07
Payer: MEDICARE

## 2025-04-07 VITALS
BODY MASS INDEX: 29.92 KG/M2 | HEIGHT: 69 IN | HEART RATE: 73 BPM | SYSTOLIC BLOOD PRESSURE: 162 MMHG | WEIGHT: 202 LBS | DIASTOLIC BLOOD PRESSURE: 88 MMHG

## 2025-04-07 DIAGNOSIS — I48.91 ATRIAL FIBRILLATION, UNSPECIFIED TYPE (MULTI): ICD-10-CM

## 2025-04-07 DIAGNOSIS — I48.0 PAROXYSMAL A-FIB (MULTI): ICD-10-CM

## 2025-04-07 DIAGNOSIS — I48.3 TYPICAL ATRIAL FLUTTER (MULTI): ICD-10-CM

## 2025-04-07 LAB
ATRIAL RATE: 73 BPM
P AXIS: 35 DEGREES
P OFFSET: 177 MS
P ONSET: 117 MS
PR INTERVAL: 208 MS
Q ONSET: 221 MS
QRS COUNT: 12 BEATS
QRS DURATION: 86 MS
QT INTERVAL: 370 MS
QTC CALCULATION(BAZETT): 407 MS
QTC FREDERICIA: 395 MS
R AXIS: 14 DEGREES
T AXIS: 38 DEGREES
T OFFSET: 406 MS
VENTRICULAR RATE: 73 BPM

## 2025-04-07 PROCEDURE — 93005 ELECTROCARDIOGRAM TRACING: CPT | Performed by: NURSE PRACTITIONER

## 2025-04-07 PROCEDURE — 3077F SYST BP >= 140 MM HG: CPT | Performed by: NURSE PRACTITIONER

## 2025-04-07 PROCEDURE — 3008F BODY MASS INDEX DOCD: CPT | Performed by: NURSE PRACTITIONER

## 2025-04-07 PROCEDURE — 1036F TOBACCO NON-USER: CPT | Performed by: NURSE PRACTITIONER

## 2025-04-07 PROCEDURE — 99214 OFFICE O/P EST MOD 30 MIN: CPT | Mod: 25 | Performed by: NURSE PRACTITIONER

## 2025-04-07 PROCEDURE — 99214 OFFICE O/P EST MOD 30 MIN: CPT | Performed by: NURSE PRACTITIONER

## 2025-04-07 PROCEDURE — 1159F MED LIST DOCD IN RCRD: CPT | Performed by: NURSE PRACTITIONER

## 2025-04-07 PROCEDURE — 93010 ELECTROCARDIOGRAM REPORT: CPT | Performed by: INTERNAL MEDICINE

## 2025-04-07 PROCEDURE — 3079F DIAST BP 80-89 MM HG: CPT | Performed by: NURSE PRACTITIONER

## 2025-04-07 PROCEDURE — 1123F ACP DISCUSS/DSCN MKR DOCD: CPT | Performed by: NURSE PRACTITIONER

## 2025-04-07 RX ORDER — METOPROLOL SUCCINATE 25 MG/1
25 TABLET, EXTENDED RELEASE ORAL DAILY
Qty: 90 TABLET | Refills: 3 | Status: SHIPPED | OUTPATIENT
Start: 2025-04-07 | End: 2026-04-02

## 2025-04-07 NOTE — PATIENT INSTRUCTIONS
Reduce metoprolol to 25mg daily  Continue Eliquis twice daily  Our office will call to schedule CTI ablation  We will follow up 4-6 weeks post ablation

## 2025-04-09 ENCOUNTER — HOSPITAL ENCOUNTER (OUTPATIENT)
Facility: HOSPITAL | Age: 65
Setting detail: OUTPATIENT SURGERY
End: 2025-04-09
Attending: INTERNAL MEDICINE | Admitting: INTERNAL MEDICINE
Payer: MEDICARE

## 2025-04-09 DIAGNOSIS — I48.3 TYPICAL ATRIAL FLUTTER (MULTI): Primary | ICD-10-CM

## 2025-04-10 ENCOUNTER — TELEPHONE (OUTPATIENT)
Dept: CARDIOLOGY | Facility: CLINIC | Age: 65
End: 2025-04-10

## 2025-04-10 DIAGNOSIS — I48.3 TYPICAL ATRIAL FLUTTER (MULTI): Primary | ICD-10-CM

## 2025-04-10 NOTE — TELEPHONE ENCOUNTER
"4/9 - Called and spoke to pt to schedule atrial flutter ablation with Dr. Roper. Pt agreeable to Tuesday 5/27 8:30am (arrive 8am) [facility].     Informed by Cardiology [facility] that order is placed as patient class \"Surgery Admit\" and will need to be changed to patient class \"Outpatient\" before it can be officially scheduled. Upon attempting to correct the order, unable to place order as \"Outpatient\" d/t pt Medicare plan. Reaching out to [place] with Epic support for assistance with resolving order issue. Will schedule with cardiology once able. 5/27 8:30am spot being saved for pt at this time.     Informed pt of this and he verbalized understanding.  "

## 2025-05-05 ENCOUNTER — TELEPHONE (OUTPATIENT)
Dept: CARDIOLOGY | Facility: HOSPITAL | Age: 65
End: 2025-05-05
Payer: MEDICARE

## 2025-05-05 DIAGNOSIS — I48.3 TYPICAL ATRIAL FLUTTER (MULTI): Primary | ICD-10-CM

## 2025-05-05 NOTE — TELEPHONE ENCOUNTER
PT called and left voicemail on Friday stating that he went to get blood work drawn before ablation and they stated that there was no orders placed. I am routing note to Levi and Amina to please further assist with this

## 2025-05-12 DIAGNOSIS — I48.3 TYPICAL ATRIAL FLUTTER (MULTI): Primary | ICD-10-CM

## 2025-05-14 ENCOUNTER — TELEPHONE (OUTPATIENT)
Dept: CARDIOLOGY | Facility: CLINIC | Age: 65
End: 2025-05-14
Payer: MEDICARE

## 2025-05-14 NOTE — TELEPHONE ENCOUNTER
Patient called in asking was his labs in I explained to him that his lab work was placed on May 5th. Patient asked can he speak with someone else to confirm I fwd call to MR Sims.

## 2025-05-17 LAB
ANION GAP SERPL CALCULATED.4IONS-SCNC: 9 MMOL/L (CALC) (ref 7–17)
BUN SERPL-MCNC: 14 MG/DL (ref 7–25)
BUN/CREAT SERPL: ABNORMAL (CALC) (ref 6–22)
CALCIUM SERPL-MCNC: 8.9 MG/DL (ref 8.6–10.3)
CHLORIDE SERPL-SCNC: 103 MMOL/L (ref 98–110)
CO2 SERPL-SCNC: 25 MMOL/L (ref 20–32)
CREAT SERPL-MCNC: 0.96 MG/DL (ref 0.7–1.35)
EGFRCR SERPLBLD CKD-EPI 2021: 87 ML/MIN/1.73M2
ERYTHROCYTE [DISTWIDTH] IN BLOOD BY AUTOMATED COUNT: 13.4 % (ref 11–15)
GLUCOSE SERPL-MCNC: 152 MG/DL (ref 65–99)
HCT VFR BLD AUTO: 42 % (ref 38.5–50)
HGB BLD-MCNC: 13.6 G/DL (ref 13.2–17.1)
MCH RBC QN AUTO: 28.9 PG (ref 27–33)
MCHC RBC AUTO-ENTMCNC: 32.4 G/DL (ref 32–36)
MCV RBC AUTO: 89.4 FL (ref 80–100)
PLATELET # BLD AUTO: 174 THOUSAND/UL (ref 140–400)
PMV BLD REES-ECKER: 10.2 FL (ref 7.5–12.5)
POTASSIUM SERPL-SCNC: 4.6 MMOL/L (ref 3.5–5.3)
RBC # BLD AUTO: 4.7 MILLION/UL (ref 4.2–5.8)
SODIUM SERPL-SCNC: 137 MMOL/L (ref 135–146)
WBC # BLD AUTO: 6.5 THOUSAND/UL (ref 3.8–10.8)

## 2025-05-23 ENCOUNTER — TELEPHONE (OUTPATIENT)
Dept: CARDIOLOGY | Facility: HOSPITAL | Age: 65
End: 2025-05-23
Payer: MEDICARE

## 2025-05-23 NOTE — TELEPHONE ENCOUNTER
----- Message from Beatrice CORDERO sent at 2025 10:37 AM EDT -----  Regardin/27 Aflutter Ablation - Dr. Roper - Instructions  Pt [facility]: [facility] (arrive 8am)This procedure will be surgery admit Please call w/ instructions - thank you!

## 2025-05-23 NOTE — TELEPHONE ENCOUNTER
Patient is scheduled for aflutter ablation with Dr. Roper on 5/27/2025 at [facility] with an arrival time of 8:00. Labs are up to date. NPO after midnight the night before ablation. Take morning medication with a sip of water. Hold Eliquis for 24 hours prior to the procedure. Patient confirms that he has not missed any doses of Eliquis in the past 30 days. Patient may need to stay overnight for observation. He will need a ride home. Patient verbalized understanding of instructions including appointment date, time, and location. All questions answered.

## 2025-05-27 ENCOUNTER — ANESTHESIA EVENT (OUTPATIENT)
Dept: CARDIOLOGY | Facility: HOSPITAL | Age: 65
End: 2025-05-27
Payer: MEDICARE

## 2025-05-27 ENCOUNTER — TELEPHONE (OUTPATIENT)
Dept: CARDIOLOGY | Facility: HOSPITAL | Age: 65
End: 2025-05-27

## 2025-05-27 ENCOUNTER — ANESTHESIA (OUTPATIENT)
Dept: CARDIOLOGY | Facility: HOSPITAL | Age: 65
End: 2025-05-27
Payer: MEDICARE

## 2025-05-27 DIAGNOSIS — I48.3 TYPICAL ATRIAL FLUTTER (MULTI): Primary | ICD-10-CM

## 2025-05-27 SDOH — HEALTH STABILITY: MENTAL HEALTH: CURRENT SMOKER: 0

## 2025-05-27 NOTE — TELEPHONE ENCOUNTER
Pt transportation via his insurance company fell through for today atrial flutter ablation with Dr. Roper. Pt called office and is agreeable to r/s to Tues 7/8 9:30am (arrive 8:30am) ANAIS Guerrero ([facility]) informed me that surgery has already documented on patient's order and a new order will be required in order to r/s patient to 7/8. Messaged Katlin MANUEL to please cancel pt current case request for atrial flutter ablation and I will contact Dr. Roper to place new case request once original has been canceled.     6/4 - Received approval from [employer] [facility] that inpatient ablation has been approved. Scanned to media.

## 2025-05-27 NOTE — NURSING NOTE
Spoke with pt this morning regarding ablation procedure. Pt states ride that was set up from insurance company never showed for him and he has no way to get here. He tried calling the insurance company and could not get an answer. Spoke with Dr. Roper and decision was made to reschedule when he can arrange a ride. Pt agreeable and I transferred him to the office to attempt to reschedule.

## 2025-05-27 NOTE — ANESTHESIA PREPROCEDURE EVALUATION
Patient: Morris Aquino    Procedure Information       Date/Time: 05/27/25 0830    Procedure: Ablation Atrial Flutter (Right) - notified surgery Delia -5/19  notified Clinton -5/19    Location: [facility] / [facility]    Providers: Riley Roper MD            Relevant Problems   Anesthesia (within normal limits)      Cardiac   (+) Benign essential hypertension   (+) Hypercholesteremia   (+) Paroxysmal atrial fibrillation (Multi)   (+) Typical atrial flutter (Multi)      Neuro   (+) Bell's palsy   (+) Chronic depression   (+) Chronic migraine   (+) Peripheral neuropathy      GI   (+) Esophageal reflux      /Renal   (+) BPH (benign prostatic hyperplasia)      Endocrine   (+) Controlled type 2 diabetes mellitus (Multi)   (+) Hypothyroidism      Hematology   (+) Anticoagulant long-term use      Musculoskeletal   (+) Osteoarthritis, multiple sites      Skin   (+) Dermatitis, eczematoid       Clinical information reviewed:   Tobacco  Allergies  Meds  Problems  Med Hx  Surg Hx   Fam Hx          NPO Detail:  No data recorded     Physical Exam    Airway  Mallampati: II  TM distance: >3 FB  Neck ROM: full  Mouth opening: 3 or more finger widths     Cardiovascular - normal exam   Dental - normal exam     Pulmonary - normal exam   Abdominal - normal exam           Anesthesia Plan    History of general anesthesia?: yes  History of complications of general anesthesia?: no    ASA 3     general     The patient is not a current smoker.    intravenous induction   Anesthetic plan and risks discussed with patient.

## 2025-06-09 PROCEDURE — RXMED WILLOW AMBULATORY MEDICATION CHARGE

## 2025-06-11 ENCOUNTER — PHARMACY VISIT (OUTPATIENT)
Dept: PHARMACY | Facility: CLINIC | Age: 65
End: 2025-06-11
Payer: COMMERCIAL

## 2025-06-16 DIAGNOSIS — M15.3 OTHER SECONDARY OSTEOARTHRITIS OF MULTIPLE SITES: ICD-10-CM

## 2025-06-16 DIAGNOSIS — M15.0 PRIMARY OSTEOARTHRITIS INVOLVING MULTIPLE JOINTS: ICD-10-CM

## 2025-06-16 DIAGNOSIS — G60.0 CMT (CHARCOT-MARIE-TOOTH DISEASE): ICD-10-CM

## 2025-06-16 DIAGNOSIS — G62.89 OTHER POLYNEUROPATHY: ICD-10-CM

## 2025-06-17 RX ORDER — TRAMADOL HYDROCHLORIDE 50 MG/1
100 TABLET, FILM COATED ORAL EVERY 8 HOURS PRN
Qty: 360 TABLET | Refills: 0 | Status: SHIPPED | OUTPATIENT
Start: 2025-06-17

## 2025-06-17 RX ORDER — NABUMENTONE 750 MG/1
750 TABLET ORAL EVERY 12 HOURS
Qty: 180 TABLET | Refills: 3 | Status: SHIPPED | OUTPATIENT
Start: 2025-06-17

## 2025-06-21 DIAGNOSIS — G62.89 OTHER POLYNEUROPATHY: ICD-10-CM

## 2025-06-23 RX ORDER — PREGABALIN 75 MG/1
75 CAPSULE ORAL 3 TIMES DAILY
Qty: 270 CAPSULE | Refills: 1 | Status: SHIPPED | OUTPATIENT
Start: 2025-06-23

## 2025-07-01 ENCOUNTER — APPOINTMENT (OUTPATIENT)
Dept: PRIMARY CARE | Facility: CLINIC | Age: 65
End: 2025-07-01
Payer: MEDICARE

## 2025-07-07 ENCOUNTER — ANESTHESIA EVENT (OUTPATIENT)
Dept: CARDIOLOGY | Facility: HOSPITAL | Age: 65
End: 2025-07-07
Payer: MEDICARE

## 2025-07-07 NOTE — CONSULTS
Consults  Patient: Morris Aquino   65 y.o. 202 lbs 91.6 kg    ALL:  NKA    PMH:    CV:  HTN, P-AFIB, PALPITATIONS, HYPERCHOLESTEROLEMIA, A-FLUTTER, EF 55-60%  RESP:  BRAXTON  ENDO:  DM2, HYPOTHYROIDISM  NEURO:  PERIPHERAL NEUROPPATHY, BLE-MUSCLE SPASMS, MIGRAINES, BELL'S PALSY  GI:  GERD    PLAN:  ASA-3 MAC IV SEDATON FOR CARDIOVERSION ON STANDBY    AIRWAY:  Hx MP2, > 3FB, NECK FROM  Procedure Information       Date/Time: 07/08/25 0930    Procedure: Ablation Atrial Flutter (Right) - notified ever in surgery-5/27  notiified Clinton-5/27    Location: [facility] / [facility]    Providers: Riley Roper MD                Clinical information reviewed:                    PHYSICAL EXAM    Anesthesia Plan

## 2025-07-08 ENCOUNTER — APPOINTMENT (OUTPATIENT)
Dept: CARDIOLOGY | Facility: HOSPITAL | Age: 65
End: 2025-07-08
Payer: MEDICARE

## 2025-07-08 ENCOUNTER — ANESTHESIA (OUTPATIENT)
Dept: CARDIOLOGY | Facility: HOSPITAL | Age: 65
End: 2025-07-08
Payer: MEDICARE

## 2025-07-08 ENCOUNTER — HOSPITAL ENCOUNTER (OUTPATIENT)
Facility: HOSPITAL | Age: 65
Discharge: HOME | End: 2025-07-09
Attending: INTERNAL MEDICINE | Admitting: INTERNAL MEDICINE
Payer: MEDICARE

## 2025-07-08 DIAGNOSIS — I48.0 PAROXYSMAL A-FIB (MULTI): ICD-10-CM

## 2025-07-08 DIAGNOSIS — I48.3 TYPICAL ATRIAL FLUTTER (MULTI): Primary | ICD-10-CM

## 2025-07-08 PROBLEM — I48.91 A-FIB (MULTI): Status: ACTIVE | Noted: 2025-07-08

## 2025-07-08 LAB
ANION GAP SERPL CALC-SCNC: 12 MMOL/L (ref 10–20)
ATRIAL RATE: 57 BPM
BUN SERPL-MCNC: 19 MG/DL (ref 6–23)
CALCIUM SERPL-MCNC: 8.7 MG/DL (ref 8.6–10.3)
CHLORIDE SERPL-SCNC: 102 MMOL/L (ref 98–107)
CO2 SERPL-SCNC: 25 MMOL/L (ref 21–32)
CREAT SERPL-MCNC: 1.17 MG/DL (ref 0.5–1.3)
EGFRCR SERPLBLD CKD-EPI 2021: 69 ML/MIN/1.73M*2
ERYTHROCYTE [DISTWIDTH] IN BLOOD BY AUTOMATED COUNT: 13.5 % (ref 11.5–14.5)
GLUCOSE SERPL-MCNC: 113 MG/DL (ref 74–99)
HCT VFR BLD AUTO: 39.4 % (ref 41–52)
HGB BLD-MCNC: 13.6 G/DL (ref 13.5–17.5)
MCH RBC QN AUTO: 29.3 PG (ref 26–34)
MCHC RBC AUTO-ENTMCNC: 34.5 G/DL (ref 32–36)
MCV RBC AUTO: 85 FL (ref 80–100)
NRBC BLD-RTO: 0 /100 WBCS (ref 0–0)
P AXIS: 41 DEGREES
P OFFSET: 145 MS
P ONSET: 108 MS
PLATELET # BLD AUTO: 182 X10*3/UL (ref 150–450)
POTASSIUM SERPL-SCNC: 4.2 MMOL/L (ref 3.5–5.3)
PR INTERVAL: 226 MS
Q ONSET: 221 MS
QRS COUNT: 9 BEATS
QRS DURATION: 90 MS
QT INTERVAL: 394 MS
QTC CALCULATION(BAZETT): 383 MS
QTC FREDERICIA: 387 MS
R AXIS: -4 DEGREES
RBC # BLD AUTO: 4.64 X10*6/UL (ref 4.5–5.9)
SODIUM SERPL-SCNC: 135 MMOL/L (ref 136–145)
T AXIS: 24 DEGREES
T OFFSET: 418 MS
VENTRICULAR RATE: 57 BPM
WBC # BLD AUTO: 7.7 X10*3/UL (ref 4.4–11.3)

## 2025-07-08 PROCEDURE — C1731 CATH, EP, 20 OR MORE ELEC: HCPCS | Performed by: INTERNAL MEDICINE

## 2025-07-08 PROCEDURE — 7100000010 HC PHASE TWO TIME - EACH INCREMENTAL 1 MINUTE: Performed by: INTERNAL MEDICINE

## 2025-07-08 PROCEDURE — 7100000011 HC EXTENDED STAY RECOVERY HOURLY - NURSING UNIT

## 2025-07-08 PROCEDURE — 93656 COMPRE EP EVAL ABLTJ ATR FIB: CPT | Performed by: INTERNAL MEDICINE

## 2025-07-08 PROCEDURE — C1732 CATH, EP, DIAG/ABL, 3D/VECT: HCPCS | Performed by: INTERNAL MEDICINE

## 2025-07-08 PROCEDURE — 99221 1ST HOSP IP/OBS SF/LOW 40: CPT | Performed by: NURSE PRACTITIONER

## 2025-07-08 PROCEDURE — 2720000007 HC OR 272 NO HCPCS: Performed by: INTERNAL MEDICINE

## 2025-07-08 PROCEDURE — 7100000009 HC PHASE TWO TIME - INITIAL BASE CHARGE: Performed by: INTERNAL MEDICINE

## 2025-07-08 PROCEDURE — 2500000004 HC RX 250 GENERAL PHARMACY W/ HCPCS (ALT 636 FOR OP/ED): Performed by: NURSE PRACTITIONER

## 2025-07-08 PROCEDURE — 3700000002 HC GENERAL ANESTHESIA TIME - EACH INCREMENTAL 1 MINUTE: Performed by: INTERNAL MEDICINE

## 2025-07-08 PROCEDURE — C1733 CATH, EP, OTHR THAN COOL-TIP: HCPCS | Performed by: INTERNAL MEDICINE

## 2025-07-08 PROCEDURE — 93005 ELECTROCARDIOGRAM TRACING: CPT | Mod: 59

## 2025-07-08 PROCEDURE — 85027 COMPLETE CBC AUTOMATED: CPT | Performed by: NURSE PRACTITIONER

## 2025-07-08 PROCEDURE — 80048 BASIC METABOLIC PNL TOTAL CA: CPT | Performed by: NURSE PRACTITIONER

## 2025-07-08 PROCEDURE — 2500000004 HC RX 250 GENERAL PHARMACY W/ HCPCS (ALT 636 FOR OP/ED): Performed by: INTERNAL MEDICINE

## 2025-07-08 PROCEDURE — 93653 COMPRE EP EVAL TX SVT: CPT | Performed by: INTERNAL MEDICINE

## 2025-07-08 PROCEDURE — 99153 MOD SED SAME PHYS/QHP EA: CPT | Performed by: INTERNAL MEDICINE

## 2025-07-08 PROCEDURE — 2500000001 HC RX 250 WO HCPCS SELF ADMINISTERED DRUGS (ALT 637 FOR MEDICARE OP): Performed by: NURSE PRACTITIONER

## 2025-07-08 PROCEDURE — 99152 MOD SED SAME PHYS/QHP 5/>YRS: CPT | Performed by: INTERNAL MEDICINE

## 2025-07-08 PROCEDURE — 2500000004 HC RX 250 GENERAL PHARMACY W/ HCPCS (ALT 636 FOR OP/ED): Mod: JW

## 2025-07-08 PROCEDURE — 3700000001 HC GENERAL ANESTHESIA TIME - INITIAL BASE CHARGE: Performed by: INTERNAL MEDICINE

## 2025-07-08 PROCEDURE — 36415 COLL VENOUS BLD VENIPUNCTURE: CPT | Performed by: NURSE PRACTITIONER

## 2025-07-08 PROCEDURE — 93010 ELECTROCARDIOGRAM REPORT: CPT | Performed by: INTERNAL MEDICINE

## 2025-07-08 RX ORDER — ACETAMINOPHEN 160 MG/5ML
650 SOLUTION ORAL EVERY 6 HOURS PRN
Status: DISCONTINUED | OUTPATIENT
Start: 2025-07-08 | End: 2025-07-09 | Stop reason: HOSPADM

## 2025-07-08 RX ORDER — LIDOCAINE HYDROCHLORIDE 20 MG/ML
INJECTION, SOLUTION INFILTRATION; PERINEURAL AS NEEDED
Status: DISCONTINUED | OUTPATIENT
Start: 2025-07-08 | End: 2025-07-08 | Stop reason: HOSPADM

## 2025-07-08 RX ORDER — PREGABALIN 75 MG/1
75 CAPSULE ORAL 3 TIMES DAILY
Status: DISCONTINUED | OUTPATIENT
Start: 2025-07-08 | End: 2025-07-09 | Stop reason: HOSPADM

## 2025-07-08 RX ORDER — TAMSULOSIN HYDROCHLORIDE 0.4 MG/1
0.4 CAPSULE ORAL DAILY
Status: DISCONTINUED | OUTPATIENT
Start: 2025-07-08 | End: 2025-07-09 | Stop reason: HOSPADM

## 2025-07-08 RX ORDER — METOPROLOL SUCCINATE 25 MG/1
25 TABLET, EXTENDED RELEASE ORAL DAILY
Status: DISCONTINUED | OUTPATIENT
Start: 2025-07-09 | End: 2025-07-09 | Stop reason: HOSPADM

## 2025-07-08 RX ORDER — METOCLOPRAMIDE HYDROCHLORIDE 5 MG/ML
10 INJECTION INTRAMUSCULAR; INTRAVENOUS ONCE
Status: DISCONTINUED | OUTPATIENT
Start: 2025-07-08 | End: 2025-07-08

## 2025-07-08 RX ORDER — PANTOPRAZOLE SODIUM 40 MG/1
40 TABLET, DELAYED RELEASE ORAL
Status: DISCONTINUED | OUTPATIENT
Start: 2025-07-09 | End: 2025-07-09 | Stop reason: HOSPADM

## 2025-07-08 RX ORDER — ACETAMINOPHEN 325 MG/1
650 TABLET ORAL EVERY 6 HOURS PRN
Status: DISCONTINUED | OUTPATIENT
Start: 2025-07-08 | End: 2025-07-09 | Stop reason: HOSPADM

## 2025-07-08 RX ORDER — CHOLECALCIFEROL (VITAMIN D3) 25 MCG
50 TABLET ORAL DAILY
Status: DISCONTINUED | OUTPATIENT
Start: 2025-07-09 | End: 2025-07-09 | Stop reason: HOSPADM

## 2025-07-08 RX ORDER — PROPOFOL 10 MG/ML
INJECTION, EMULSION INTRAVENOUS AS NEEDED
Status: DISCONTINUED | OUTPATIENT
Start: 2025-07-08 | End: 2025-07-08

## 2025-07-08 RX ORDER — ACETAMINOPHEN 650 MG/1
650 SUPPOSITORY RECTAL EVERY 6 HOURS PRN
Status: DISCONTINUED | OUTPATIENT
Start: 2025-07-08 | End: 2025-07-09 | Stop reason: HOSPADM

## 2025-07-08 RX ORDER — SODIUM CHLORIDE 9 MG/ML
1000 INJECTION, SOLUTION INTRAVENOUS ONCE AS NEEDED
Status: ACTIVE | OUTPATIENT
Start: 2025-07-08 | End: 2025-07-08

## 2025-07-08 RX ORDER — TRAMADOL HYDROCHLORIDE 50 MG/1
100 TABLET, FILM COATED ORAL EVERY 8 HOURS PRN
Status: DISCONTINUED | OUTPATIENT
Start: 2025-07-08 | End: 2025-07-09 | Stop reason: HOSPADM

## 2025-07-08 RX ORDER — CETIRIZINE HYDROCHLORIDE 10 MG/1
10 TABLET ORAL DAILY
Status: DISCONTINUED | OUTPATIENT
Start: 2025-07-08 | End: 2025-07-09 | Stop reason: HOSPADM

## 2025-07-08 RX ORDER — METFORMIN HYDROCHLORIDE 1000 MG/1
1000 TABLET ORAL
Status: DISCONTINUED | OUTPATIENT
Start: 2025-07-08 | End: 2025-07-09 | Stop reason: HOSPADM

## 2025-07-08 RX ORDER — MIDAZOLAM HYDROCHLORIDE 1 MG/ML
INJECTION, SOLUTION INTRAMUSCULAR; INTRAVENOUS AS NEEDED
Status: DISCONTINUED | OUTPATIENT
Start: 2025-07-08 | End: 2025-07-08 | Stop reason: HOSPADM

## 2025-07-08 RX ORDER — MORPHINE SULFATE 2 MG/ML
2 INJECTION, SOLUTION INTRAMUSCULAR; INTRAVENOUS EVERY 6 HOURS PRN
Status: DISCONTINUED | OUTPATIENT
Start: 2025-07-08 | End: 2025-07-09 | Stop reason: HOSPADM

## 2025-07-08 RX ORDER — FENTANYL CITRATE 50 UG/ML
INJECTION, SOLUTION INTRAMUSCULAR; INTRAVENOUS AS NEEDED
Status: DISCONTINUED | OUTPATIENT
Start: 2025-07-08 | End: 2025-07-08 | Stop reason: HOSPADM

## 2025-07-08 RX ORDER — SODIUM CHLORIDE 9 MG/ML
100 INJECTION, SOLUTION INTRAVENOUS CONTINUOUS
Status: ACTIVE | OUTPATIENT
Start: 2025-07-08 | End: 2025-07-08

## 2025-07-08 RX ORDER — FAMOTIDINE 10 MG/ML
20 INJECTION, SOLUTION INTRAVENOUS ONCE
Status: DISCONTINUED | OUTPATIENT
Start: 2025-07-08 | End: 2025-07-08

## 2025-07-08 RX ORDER — ATORVASTATIN CALCIUM 40 MG/1
80 TABLET, FILM COATED ORAL NIGHTLY
Status: DISCONTINUED | OUTPATIENT
Start: 2025-07-08 | End: 2025-07-09 | Stop reason: HOSPADM

## 2025-07-08 RX ORDER — LEVOTHYROXINE SODIUM 100 UG/1
100 TABLET ORAL DAILY
Status: DISCONTINUED | OUTPATIENT
Start: 2025-07-09 | End: 2025-07-09 | Stop reason: HOSPADM

## 2025-07-08 RX ORDER — NABUMENTONE 750 MG/1
750 TABLET ORAL EVERY 12 HOURS
Status: DISCONTINUED | OUTPATIENT
Start: 2025-07-08 | End: 2025-07-09 | Stop reason: HOSPADM

## 2025-07-08 RX ADMIN — PROPOFOL 50 MG: 10 INJECTION, EMULSION INTRAVENOUS at 11:17

## 2025-07-08 RX ADMIN — PREGABALIN 75 MG: 75 CAPSULE ORAL at 20:44

## 2025-07-08 RX ADMIN — TRAMADOL HYDROCHLORIDE 100 MG: 50 TABLET, COATED ORAL at 20:44

## 2025-07-08 RX ADMIN — ATORVASTATIN CALCIUM 80 MG: 40 TABLET, FILM COATED ORAL at 20:44

## 2025-07-08 RX ADMIN — PREGABALIN 75 MG: 75 CAPSULE ORAL at 15:19

## 2025-07-08 RX ADMIN — SODIUM CHLORIDE 100 ML/HR: 9 INJECTION, SOLUTION INTRAVENOUS at 09:00

## 2025-07-08 SDOH — ECONOMIC STABILITY: INCOME INSECURITY: IN THE PAST 12 MONTHS HAS THE ELECTRIC, GAS, OIL, OR WATER COMPANY THREATENED TO SHUT OFF SERVICES IN YOUR HOME?: NO

## 2025-07-08 SDOH — SOCIAL STABILITY: SOCIAL INSECURITY: WITHIN THE LAST YEAR, HAVE YOU BEEN HUMILIATED OR EMOTIONALLY ABUSED IN OTHER WAYS BY YOUR PARTNER OR EX-PARTNER?: NO

## 2025-07-08 SDOH — SOCIAL STABILITY: SOCIAL INSECURITY
ASK PARENT OR GUARDIAN: ARE THERE TIMES WHEN YOU, YOUR CHILD(REN), OR ANY MEMBER OF YOUR HOUSEHOLD FEEL UNSAFE, HARMED, OR THREATENED AROUND PERSONS WITH WHOM YOU KNOW OR LIVE?: NO

## 2025-07-08 SDOH — ECONOMIC STABILITY: HOUSING INSECURITY: IN THE LAST 12 MONTHS, WAS THERE A TIME WHEN YOU WERE NOT ABLE TO PAY THE MORTGAGE OR RENT ON TIME?: NO

## 2025-07-08 SDOH — SOCIAL STABILITY: SOCIAL INSECURITY: DOES ANYONE TRY TO KEEP YOU FROM HAVING/CONTACTING OTHER FRIENDS OR DOING THINGS OUTSIDE YOUR HOME?: NO

## 2025-07-08 SDOH — ECONOMIC STABILITY: FOOD INSECURITY: HOW HARD IS IT FOR YOU TO PAY FOR THE VERY BASICS LIKE FOOD, HOUSING, MEDICAL CARE, AND HEATING?: NOT VERY HARD

## 2025-07-08 SDOH — SOCIAL STABILITY: SOCIAL INSECURITY: HAVE YOU HAD ANY THOUGHTS OF HARMING ANYONE ELSE?: NO

## 2025-07-08 SDOH — SOCIAL STABILITY: SOCIAL INSECURITY: DO YOU FEEL ANYONE HAS EXPLOITED OR TAKEN ADVANTAGE OF YOU FINANCIALLY OR OF YOUR PERSONAL PROPERTY?: NO

## 2025-07-08 SDOH — ECONOMIC STABILITY: FOOD INSECURITY: WITHIN THE PAST 12 MONTHS, YOU WORRIED THAT YOUR FOOD WOULD RUN OUT BEFORE YOU GOT THE MONEY TO BUY MORE.: NEVER TRUE

## 2025-07-08 SDOH — ECONOMIC STABILITY: HOUSING INSECURITY: IN THE PAST 12 MONTHS, HOW MANY TIMES HAVE YOU MOVED WHERE YOU WERE LIVING?: 0

## 2025-07-08 SDOH — SOCIAL STABILITY: SOCIAL INSECURITY: HAVE YOU HAD THOUGHTS OF HARMING ANYONE ELSE?: NO

## 2025-07-08 SDOH — HEALTH STABILITY: PHYSICAL HEALTH
HOW OFTEN DO YOU NEED TO HAVE SOMEONE HELP YOU WHEN YOU READ INSTRUCTIONS, PAMPHLETS, OR OTHER WRITTEN MATERIAL FROM YOUR DOCTOR OR PHARMACY?: NEVER

## 2025-07-08 SDOH — ECONOMIC STABILITY: HOUSING INSECURITY: AT ANY TIME IN THE PAST 12 MONTHS, WERE YOU HOMELESS OR LIVING IN A SHELTER (INCLUDING NOW)?: NO

## 2025-07-08 SDOH — SOCIAL STABILITY: SOCIAL INSECURITY: ABUSE: ADULT

## 2025-07-08 SDOH — HEALTH STABILITY: PHYSICAL HEALTH: ON AVERAGE, HOW MANY MINUTES DO YOU ENGAGE IN EXERCISE AT THIS LEVEL?: 0 MIN

## 2025-07-08 SDOH — ECONOMIC STABILITY: FOOD INSECURITY: WITHIN THE PAST 12 MONTHS, THE FOOD YOU BOUGHT JUST DIDN'T LAST AND YOU DIDN'T HAVE MONEY TO GET MORE.: NEVER TRUE

## 2025-07-08 SDOH — SOCIAL STABILITY: SOCIAL INSECURITY: WITHIN THE LAST YEAR, HAVE YOU BEEN AFRAID OF YOUR PARTNER OR EX-PARTNER?: NO

## 2025-07-08 SDOH — HEALTH STABILITY: PHYSICAL HEALTH: ON AVERAGE, HOW MANY DAYS PER WEEK DO YOU ENGAGE IN MODERATE TO STRENUOUS EXERCISE (LIKE A BRISK WALK)?: 0 DAYS

## 2025-07-08 SDOH — ECONOMIC STABILITY: TRANSPORTATION INSECURITY: IN THE PAST 12 MONTHS, HAS LACK OF TRANSPORTATION KEPT YOU FROM MEDICAL APPOINTMENTS OR FROM GETTING MEDICATIONS?: NO

## 2025-07-08 SDOH — HEALTH STABILITY: MENTAL HEALTH: CURRENT SMOKER: 0

## 2025-07-08 SDOH — SOCIAL STABILITY: SOCIAL INSECURITY: WERE YOU ABLE TO COMPLETE ALL THE BEHAVIORAL HEALTH SCREENINGS?: YES

## 2025-07-08 SDOH — SOCIAL STABILITY: SOCIAL INSECURITY: DO YOU FEEL UNSAFE GOING BACK TO THE PLACE WHERE YOU ARE LIVING?: NO

## 2025-07-08 SDOH — SOCIAL STABILITY: SOCIAL INSECURITY: HAS ANYONE EVER THREATENED TO HURT YOUR FAMILY OR YOUR PETS?: NO

## 2025-07-08 SDOH — SOCIAL STABILITY: SOCIAL INSECURITY: ARE YOU OR HAVE YOU BEEN THREATENED OR ABUSED PHYSICALLY, EMOTIONALLY, OR SEXUALLY BY ANYONE?: NO

## 2025-07-08 SDOH — ECONOMIC STABILITY: HOUSING INSECURITY: DO YOU FEEL UNSAFE GOING BACK TO THE PLACE WHERE YOU LIVE?: NO

## 2025-07-08 SDOH — SOCIAL STABILITY: SOCIAL INSECURITY: ARE THERE ANY APPARENT SIGNS OF INJURIES/BEHAVIORS THAT COULD BE RELATED TO ABUSE/NEGLECT?: NO

## 2025-07-08 ASSESSMENT — COGNITIVE AND FUNCTIONAL STATUS - GENERAL
DAILY ACTIVITIY SCORE: 24
CLIMB 3 TO 5 STEPS WITH RAILING: A LITTLE
PATIENT BASELINE BEDBOUND: NO
MOVING TO AND FROM BED TO CHAIR: A LITTLE
MOBILITY SCORE: 22

## 2025-07-08 ASSESSMENT — PATIENT HEALTH QUESTIONNAIRE - PHQ9
SUM OF ALL RESPONSES TO PHQ9 QUESTIONS 1 & 2: 0
2. FEELING DOWN, DEPRESSED OR HOPELESS: NOT AT ALL
1. LITTLE INTEREST OR PLEASURE IN DOING THINGS: NOT AT ALL

## 2025-07-08 ASSESSMENT — PAIN - FUNCTIONAL ASSESSMENT
PAIN_FUNCTIONAL_ASSESSMENT: 0-10

## 2025-07-08 ASSESSMENT — PAIN SCALES - GENERAL
PAINLEVEL_OUTOF10: 0 - NO PAIN
PAINLEVEL_OUTOF10: 0 - NO PAIN
PAINLEVEL_OUTOF10: 6
PAINLEVEL_OUTOF10: 4
PAINLEVEL_OUTOF10: 0 - NO PAIN
PAINLEVEL_OUTOF10: 0 - NO PAIN
PAIN_LEVEL: 0
PAINLEVEL_OUTOF10: 6
PAINLEVEL_OUTOF10: 0 - NO PAIN
PAINLEVEL_OUTOF10: 0 - NO PAIN

## 2025-07-08 ASSESSMENT — PAIN DESCRIPTION - DESCRIPTORS: DESCRIPTORS: ACHING

## 2025-07-08 ASSESSMENT — ACTIVITIES OF DAILY LIVING (ADL)
HEARING - LEFT EAR: FUNCTIONAL
DRESSING YOURSELF: INDEPENDENT
GROOMING: INDEPENDENT
TOILETING: INDEPENDENT
ASSISTIVE_DEVICE: EYEGLASSES;CANE
LACK_OF_TRANSPORTATION: NO
HEARING - RIGHT EAR: FUNCTIONAL
JUDGMENT_ADEQUATE_SAFELY_COMPLETE_DAILY_ACTIVITIES: YES
PATIENT'S MEMORY ADEQUATE TO SAFELY COMPLETE DAILY ACTIVITIES?: YES
ADEQUATE_TO_COMPLETE_ADL: YES
BATHING: INDEPENDENT
FEEDING YOURSELF: INDEPENDENT
WALKS IN HOME: INDEPENDENT

## 2025-07-08 ASSESSMENT — LIFESTYLE VARIABLES
AUDIT-C TOTAL SCORE: 0
HOW OFTEN DO YOU HAVE 6 OR MORE DRINKS ON ONE OCCASION: NEVER
SKIP TO QUESTIONS 9-10: 1
HOW OFTEN DO YOU HAVE A DRINK CONTAINING ALCOHOL: NEVER
AUDIT-C TOTAL SCORE: 0
HOW MANY STANDARD DRINKS CONTAINING ALCOHOL DO YOU HAVE ON A TYPICAL DAY: PATIENT DOES NOT DRINK

## 2025-07-08 ASSESSMENT — ENCOUNTER SYMPTOMS
LIGHT-HEADEDNESS: 1
DIZZINESS: 1
PALPITATIONS: 1
FATIGUE: 1

## 2025-07-08 ASSESSMENT — COLUMBIA-SUICIDE SEVERITY RATING SCALE - C-SSRS
6. HAVE YOU EVER DONE ANYTHING, STARTED TO DO ANYTHING, OR PREPARED TO DO ANYTHING TO END YOUR LIFE?: NO
2. HAVE YOU ACTUALLY HAD ANY THOUGHTS OF KILLING YOURSELF?: NO
1. IN THE PAST MONTH, HAVE YOU WISHED YOU WERE DEAD OR WISHED YOU COULD GO TO SLEEP AND NOT WAKE UP?: NO

## 2025-07-08 ASSESSMENT — PAIN DESCRIPTION - LOCATION: LOCATION: GENERALIZED

## 2025-07-08 NOTE — ANESTHESIA POSTPROCEDURE EVALUATION
Patient: Morris Aquino    Procedure Summary       Date: 07/08/25 Room / Location: [facility]    Anesthesia Start: 1115 Anesthesia Stop: 1132    Procedure: Ablation Atrial Flutter (Right) Diagnosis: Typical atrial flutter (Multi)    Providers: Riley Roper MD Responsible Provider: Morris Jaquez MD    Anesthesia Type: MAC ASA Status: 3            Anesthesia Type: MAC    Vitals Value Taken Time   /70 07/08/25 15:41   Temp 98 07/08/25 15:41   Pulse 76 07/08/25 15:41   Resp 14 07/08/25 15:41   SpO2 99 07/08/25 15:41       Anesthesia Post Evaluation    Patient location during evaluation: floor  Patient participation: complete - patient participated  Level of consciousness: awake  Pain score: 0  Pain management: adequate  Airway patency: patent  Cardiovascular status: acceptable  Respiratory status: acceptable  Hydration status: acceptable  Postoperative Nausea and Vomiting: none        There were no known notable events for this encounter.

## 2025-07-08 NOTE — Clinical Note
Closure device placed in the right femoral vein. Site closed by manually applied. Deployed By: Susi Kraus RT

## 2025-07-08 NOTE — H&P
History Of Present Illness  Morris Aquino is a 65 y.o. male presenting with atrial flutter. The patient has a significant past medical history of atrial flutter diagnosed approximately 2 year ago, type II DM-controlled with PO meds and diet-no insulin, Charcot-My Tooth disease, obstructive sleep apnea and not compliant with wearing CPAP. The patient was last seen by EP in April 2025, He experienced an episode of atrial flutter in October 2023. He again had recurrence of atrial flutter and was hospitalized in November 2024. Echocardiogram May 2024 showed normal LV function and normal left atrium. He was originally scheduled for ablation in September 2024 but had to cancel the procedure due to transportation. He has been continued on OAC due to elevated [id number] score. The patient explains that he has been having near syncope episodes. The last was one month ago. He also had an episode where he had atrial flutter for 4 hours accompanied by left shoulder pain. The patient said he is experiencing fatigue with interrupted sleep due to frequently waking up to use the restroom.      Past Medical History  Medical History[1]    Surgical History  Surgical History[2]     Social History  He reports that he has never smoked. He has never used smokeless tobacco. He reports that he does not drink alcohol and does not use drugs.    Family History  Family History[3]     Allergies  Patient has no known allergies.    Review of Systems   Constitutional:  Positive for fatigue.   Cardiovascular:  Positive for palpitations.   Neurological:  Positive for dizziness and light-headedness.        Physical Exam     Last Recorded Vitals  Blood pressure 165/84, pulse 58, temperature 36.9 °C (98.4 °F), temperature source Temporal, resp. rate 14, weight 90.7 kg (200 lb), SpO2 98%.    Relevant Results       Assessment & Plan  Typical atrial flutter (Multi)    A-fib (Multi)      Plan: Atrial flutter ablation     I spent 35 minutes in the  professional and overall care of this patient.      Lia Ayala, APRN-CNP         [1]   Past Medical History:  Diagnosis Date    Arrhythmia     Carpio's palsy     CMT (Charcot-My-Tooth disease)     DM (diabetes mellitus), type 2 03/10/2022    GERD (gastroesophageal reflux disease)     History of corneal abrasion 2017    History of epididymitis 2013    HTN (hypertension)     Hyperlipidemia     Hypothyroid     Migraines     BRAXTON (obstructive sleep apnea)     Polyneuropathy     Retention of urine, unspecified 2013    Incomplete emptying of bladder   [2]   Past Surgical History:  Procedure Laterality Date    CERVICAL LAMINECTOMY  2013    Laminectomy Cervical    DENTAL SURGERY  09/10/2015    FASCIOTOMY  2013    FOOT   [3]   Family History  Problem Relation Name Age of Onset    Charcot-My-Tooth disease Mother      Breast cancer Mother           at age 37 of breast cancer    Other (Cardiac disorder) Father      Hypertension Father      Diabetes Father          Type II    Colon cancer Maternal Grandfather      Charcot-My-Tooth disease Sibling      Asthma Other      Diabetes Other      Hypertension Other

## 2025-07-08 NOTE — ANESTHESIA PREPROCEDURE EVALUATION
Consults  Patient: Morris Aquino   65 y.o. 202 lbs 91.6 kg    ALL:  NKA    PMH:    CV:  HTN, P-AFIB, PALPITATIONS, HYPERCHOLESTEROLEMIA, A-FLUTTER, EF 55-60%  RESP:  BRAXTON  ENDO:  DM2, HYPOTHYROIDISM  NEURO:  PERIPHERAL NEUROPPATHY, BLE-MUSCLE SPASMS, MIGRAINES, BELL'S PALSY  GI:  GERD    PLAN:  ASA-3 MAC IV SEDATON FOR CARDIOVERSION ON STANDBY    AIRWAY:  Hx MP2, > 3FB, NECK FROM  Procedure Information       Date/Time: 07/08/25 0930    Procedure: Ablation Atrial Flutter (Right) - notified ever in surgery-5/27  notiified [place]-5/27    Location[facility]    Providers: Riley Roper MD            Relevant Problems   Anesthesia (within normal limits)      Cardiac   (+) Benign essential hypertension   (+) Hypercholesteremia   (+) Paroxysmal atrial fibrillation (Multi)   (+) Typical atrial flutter (Multi)      Neuro   (+) Bell's palsy   (+) Chronic depression   (+) Chronic migraine   (+) Peripheral neuropathy      GI   (+) Esophageal reflux      /Renal   (+) BPH (benign prostatic hyperplasia)      Endocrine   (+) Controlled type 2 diabetes mellitus (Multi)   (+) Hypothyroidism      Hematology   (+) Anticoagulant long-term use      Musculoskeletal   (+) Osteoarthritis, multiple sites      Skin   (+) Dermatitis, eczematoid       Clinical information reviewed:   Tobacco  Allergies  Meds   Med Hx  Surg Hx   Fam Hx  Soc Hx         Physical Exam    Airway  Mallampati: II  TM distance: >3 FB  Neck ROM: full  Mouth opening: 3 or more finger widths     Cardiovascular - normal exam  Rhythm: irregular  Rate: normal     Dental    Pulmonary - normal examBreath sounds clear to auscultation     Abdominal (+) obese  Abdomen: soft             Anesthesia Plan    History of general anesthesia?: yes  History of complications of general anesthesia?: no    ASA 3     MAC     The patient is not a current smoker.  Patient did not smoke on day of procedure.    intravenous induction   Anesthetic plan and  risks discussed with patient.  Use of blood products discussed with patient who consented to blood products.    Plan discussed with attending.

## 2025-07-08 NOTE — NURSING NOTE
Unable to reach pt after two calls. Voice message left both times. Pt did not have labs done and attempts were made to reach pt to discuss labs, transportation, and holding eliquis for procedure. Pt was asked to come in early on voicemail.

## 2025-07-09 VITALS
HEART RATE: 71 BPM | SYSTOLIC BLOOD PRESSURE: 122 MMHG | RESPIRATION RATE: 17 BRPM | TEMPERATURE: 98.1 F | DIASTOLIC BLOOD PRESSURE: 82 MMHG | OXYGEN SATURATION: 93 % | HEIGHT: 69 IN | WEIGHT: 204.37 LBS | BODY MASS INDEX: 30.27 KG/M2

## 2025-07-09 DIAGNOSIS — E03.9 HYPOTHYROIDISM, UNSPECIFIED TYPE: ICD-10-CM

## 2025-07-09 LAB
ALBUMIN SERPL BCP-MCNC: 3.6 G/DL (ref 3.4–5)
ANION GAP SERPL CALC-SCNC: 11 MMOL/L (ref 10–20)
BUN SERPL-MCNC: 18 MG/DL (ref 6–23)
CALCIUM SERPL-MCNC: 8.6 MG/DL (ref 8.6–10.3)
CHLORIDE SERPL-SCNC: 104 MMOL/L (ref 98–107)
CO2 SERPL-SCNC: 25 MMOL/L (ref 21–32)
CREAT SERPL-MCNC: 1.02 MG/DL (ref 0.5–1.3)
EGFRCR SERPLBLD CKD-EPI 2021: 81 ML/MIN/1.73M*2
GLUCOSE SERPL-MCNC: 93 MG/DL (ref 74–99)
PHOSPHATE SERPL-MCNC: 3.6 MG/DL (ref 2.5–4.9)
POTASSIUM SERPL-SCNC: 4.3 MMOL/L (ref 3.5–5.3)
SODIUM SERPL-SCNC: 136 MMOL/L (ref 136–145)

## 2025-07-09 PROCEDURE — 2500000002 HC RX 250 W HCPCS SELF ADMINISTERED DRUGS (ALT 637 FOR MEDICARE OP, ALT 636 FOR OP/ED): Performed by: NURSE PRACTITIONER

## 2025-07-09 PROCEDURE — 2500000001 HC RX 250 WO HCPCS SELF ADMINISTERED DRUGS (ALT 637 FOR MEDICARE OP): Performed by: NURSE PRACTITIONER

## 2025-07-09 PROCEDURE — 99239 HOSP IP/OBS DSCHRG MGMT >30: CPT | Performed by: NURSE PRACTITIONER

## 2025-07-09 PROCEDURE — 7100000011 HC EXTENDED STAY RECOVERY HOURLY - NURSING UNIT

## 2025-07-09 PROCEDURE — 36415 COLL VENOUS BLD VENIPUNCTURE: CPT | Performed by: NURSE PRACTITIONER

## 2025-07-09 PROCEDURE — 80069 RENAL FUNCTION PANEL: CPT | Performed by: NURSE PRACTITIONER

## 2025-07-09 RX ORDER — LEVOTHYROXINE SODIUM 100 UG/1
100 TABLET ORAL DAILY
Qty: 90 TABLET | Refills: 1 | Status: SHIPPED | OUTPATIENT
Start: 2025-07-09

## 2025-07-09 RX ORDER — METOPROLOL SUCCINATE 25 MG/1
25 TABLET, EXTENDED RELEASE ORAL 2 TIMES DAILY
Qty: 180 TABLET | Refills: 3 | Status: SHIPPED | OUTPATIENT
Start: 2025-07-09 | End: 2026-07-09

## 2025-07-09 RX ADMIN — TAMSULOSIN HYDROCHLORIDE 0.4 MG: 0.4 CAPSULE ORAL at 08:46

## 2025-07-09 RX ADMIN — VORTIOXETINE 20 MG: 10 TABLET, FILM COATED ORAL at 08:46

## 2025-07-09 RX ADMIN — PREGABALIN 75 MG: 75 CAPSULE ORAL at 08:46

## 2025-07-09 RX ADMIN — LEVOTHYROXINE SODIUM 100 MCG: 0.1 TABLET ORAL at 08:46

## 2025-07-09 RX ADMIN — APIXABAN 5 MG: 5 TABLET, FILM COATED ORAL at 08:46

## 2025-07-09 RX ADMIN — PANTOPRAZOLE SODIUM 40 MG: 40 TABLET, DELAYED RELEASE ORAL at 06:46

## 2025-07-09 RX ADMIN — Medication 50 MCG: at 08:46

## 2025-07-09 RX ADMIN — CETIRIZINE HYDROCHLORIDE 10 MG: 10 TABLET, FILM COATED ORAL at 08:46

## 2025-07-09 RX ADMIN — METOPROLOL SUCCINATE 25 MG: 25 TABLET, EXTENDED RELEASE ORAL at 08:46

## 2025-07-09 ASSESSMENT — COGNITIVE AND FUNCTIONAL STATUS - GENERAL
MOBILITY SCORE: 24
DAILY ACTIVITIY SCORE: 24

## 2025-07-09 ASSESSMENT — PAIN - FUNCTIONAL ASSESSMENT
PAIN_FUNCTIONAL_ASSESSMENT: 0-10
PAIN_FUNCTIONAL_ASSESSMENT: 0-10

## 2025-07-09 ASSESSMENT — PAIN SCALES - GENERAL
PAINLEVEL_OUTOF10: 0 - NO PAIN
PAINLEVEL_OUTOF10: 3
PAINLEVEL_OUTOF10: 3

## 2025-07-09 ASSESSMENT — ACTIVITIES OF DAILY LIVING (ADL): LACK_OF_TRANSPORTATION: NO

## 2025-07-09 NOTE — CARE PLAN
The patient's goals for the shift include pt will rest comfortably through night.    The clinical goals for the shift include no falls    Over the shift, the patient did make progress toward the following goals. Barriers to progression include education. Recommendations to address these barriers include education reinforcement.

## 2025-07-09 NOTE — PROGRESS NOTES
Social work consult placed for positive medical risk screen. SW reviewed pt's chart and communicated with T[facility]. No SW needs foreseen at this time. SW signing off; available upon request.    JULIA Thrasher, LSW ([id number])   Care Transitions

## 2025-07-09 NOTE — PROGRESS NOTES
07/09/25 0847   Discharge Planning   Living Arrangements Alone   Support Systems None   Assistance Needed Patient is A&OX4, independent in ADLs, ambulates without assistive devices, drives, and wears no O2 baseline. Patient reports he has a CPAP at home. No active HHC agency. On Eliquis prio to admit. PCP Dr. Ct [facility]   Type of Residence Private residence   Number of Stairs to Enter Residence 3   Number of Stairs Within Residence 0   Do you have animals or pets at home? Yes   Type of Animals or Pets 4 cats   Who is requesting discharge planning? Provider   Home or Post Acute Services None   Expected Discharge Disposition Home  (Home, no needs. Patient denied need for HHC.)   Does the patient need discharge transport arranged? No   Financial Resource Strain   How hard is it for you to pay for the very basics like food, housing, medical care, and heating? Not very   Housing Stability   In the last 12 months, was there a time when you were not able to pay the mortgage or rent on time? N   In the past 12 months, how many times have you moved where you were living? 0   At any time in the past 12 months, were you homeless or living in a shelter (including now)? N   Transportation Needs   In the past 12 months, has lack of transportation kept you from medical appointments or from getting medications? no   In the past 12 months, has lack of transportation kept you from meetings, work, or from getting things needed for daily living? No   Stroke Family Assessment   Stroke Family Assessment Needed No   Intensity of Service   Intensity of Service 0-30 min

## 2025-07-09 NOTE — DISCHARGE SUMMARY
Discharge Diagnosis  Typical atrial flutter (Multi)    Issues Requiring Follow-Up  Follow up is scheduled in 6-8 weeks    Test Results Pending At Discharge  Pending Labs       No current pending labs.            Hospital Course   Morris Aquino is a 65 y.o. male presenting with atrial flutter. The patient has a significant past medical history of atrial flutter diagnosed approximately 2 year ago, type II DM-controlled with PO meds and diet-no insulin, Charcot-My Tooth disease, obstructive sleep apnea and not compliant with wearing CPAP. The patient was last seen by EP in April 2025, He experienced an episode of atrial flutter in October 2023. He again had recurrence of atrial flutter and was hospitalized in November 2024. Echocardiogram May 2024 showed normal LV function and normal left atrium. He was originally scheduled for ablation in September 2024 but had to cancel the procedure due to transportation. He has been continued on OAC due to elevated [id number] score. The patient explains that he has been having near syncope episodes. The last was one month ago. He also had an episode where he had atrial flutter for 4 hours accompanied by left shoulder pain. The patient said he is experiencing fatigue with interrupted sleep due to frequently waking up to use the restroom.      7/8/2025: Presented for RFA cavotricuspid isthmus. During procedure he developed atrial fibrillation which persisted. Cavotricuspid isthmus ablation was performed, confirmed bidirectional isthmus block. He did have successful cardioversion for atrial fibrillation.     7/9: no overnight concerns. Telemetry reviewed which showed sinus rhythm. Labs reviewed. Right groin drsg removed no hematoma or bleeding at site. Will restart Eliquis now. Reviewed activity restrictions for the next 7 days. Reviewed the importance of continued medications including Eliquis twice daily and metoprolol. Follow up is scheduled in 6-8 weeks. Will plan for monitor  to assess for any further atrial fibrillation.     Visit Vitals  /82 (BP Location: Left arm, Patient Position: Lying)   Pulse 71   Temp 36.7 °C (98.1 °F) (Temporal)   Resp 17     Vitals:    07/09/25 0425   Weight: 92.7 kg (204 lb 5.9 oz)       Immunization History   Administered Date(s) Administered    Flu vaccine (IIV4), preservative free *Check age/dose* 09/14/2018, 09/07/2021, 10/02/2023    Flu vaccine, trivalent, preservative free, HIGH-DOSE, age 65y+ (Fluzone) 09/27/2024, 11/27/2024    Flu vaccine, trivalent, preservative free, age 6 months and greater (Fluarix/Fluzone/Flulaval) 09/29/2016    Influenza, Unspecified 09/25/2014, 09/29/2016, 09/26/2017    Influenza, injectable, quadrivalent 12/20/2019, 09/28/2020, 09/08/2022    Influenza, seasonal, injectable 09/08/2022    Moderna COVID-19 vaccine, bivalent, blue cap/gray label *Check age/dose* 12/09/2022    Moderna SARS-CoV-2 Vaccination 04/06/2021, 05/04/2021, 12/09/2021, 06/13/2022, 12/09/2022    Pneumococcal conjugate vaccine, 20-valent (PREVNAR 20) 03/26/2024    Pneumococcal polysaccharide vaccine, 23-valent, age 2 years and older (PNEUMOVAX 23) 09/07/2021       Results        Pertinent Physical Exam At Time of Discharge  Physical Exam  Constitutional:       Appearance: Normal appearance.   Eyes:      Pupils: Pupils are equal, round, and reactive to light.   Cardiovascular:      Rate and Rhythm: Normal rate and regular rhythm.      Heart sounds: Normal heart sounds.   Pulmonary:      Effort: Pulmonary effort is normal.      Breath sounds: Normal breath sounds.   Musculoskeletal:         General: Normal range of motion.   Skin:     General: Skin is warm and dry.   Neurological:      Mental Status: He is alert and oriented to person, place, and time.       Home Medications     Medication List      CHANGE how you take these medications     metoprolol succinate XL 25 mg 24 hr tablet; Commonly known as:   Toprol-XL; Take 1 tablet (25 mg) by mouth 2 times a  day. Do not crush or   chew.; What changed: when to take this     CONTINUE taking these medications     atorvastatin 80 mg tablet; Commonly known as: Lipitor; TAKE 1 TABLET AT   BEDTIME   Blood Glucose Test; Generic drug: blood sugar diagnostic; 1 each once   daily. TEST ONCE DAILY AS DIRECTED DX E11.9   cetirizine 10 mg tablet; Commonly known as: ZyrTEC   cholecalciferol 50 mcg (2,000 units) tablet; Commonly known as: Vitamin   D3; Take 1 tablet (50 mcg) by mouth once daily.   Eliquis 5 mg tablet; Generic drug: apixaban; Take 1 tablet (5 mg) by   mouth 2 times a day.   LANCETS MISC   metFORMIN 500 mg tablet; Commonly known as: Glucophage; TAKE 2 TABLETS   TWICE A DAY WITH MORNING AND EVENING MEALS   nabumetone 750 mg tablet; Commonly known as: Relafen; TAKE 1 TABLET   EVERY 12 HOURS   omeprazole 20 mg DR capsule; Commonly known as: PriLOSEC   pregabalin 75 mg capsule; Commonly known as: Lyrica; TAKE 1 CAPSULE   THREE TIMES A DAY   tamsulosin 0.4 mg 24 hr capsule; Commonly known as: Flomax; TAKE 1   CAPSULE ONCE DAILY 30 MINUTES FOLLOWING THE SAME MEAL DAILY   traMADol 50 mg tablet; Commonly known as: Ultram; Take 2 tablets (100   mg) by mouth every 8 hours if needed for severe pain (7 - 10).   Trintellix 20 mg tablet tablet; Generic drug: vortioxetine; Take 1   tablet (20 mg) by mouth once daily.       Outpatient Follow-Up  Future Appointments   Date Time Provider Department [place]   8/21/2025  3:00 PM Ct Win MD [id number]   9/3/2025 11:30 AM EHSAN Macedo [id number] [facility]       EHSAN Macedo

## 2025-07-09 NOTE — CARE PLAN
The patient's goals for the shift include eat    The clinical goals for the shift include no falls    Over the shift, the patient did make progress toward the following goals. Barriers to progression include understanding. Recommendations to address these barriers include education.

## 2025-08-21 ENCOUNTER — APPOINTMENT (OUTPATIENT)
Dept: PRIMARY CARE | Facility: CLINIC | Age: 65
End: 2025-08-21
Payer: MEDICARE

## 2025-08-21 ENCOUNTER — TELEPHONE (OUTPATIENT)
Dept: CARDIOLOGY | Facility: HOSPITAL | Age: 65
End: 2025-08-21

## 2025-08-21 VITALS
SYSTOLIC BLOOD PRESSURE: 118 MMHG | HEART RATE: 68 BPM | BODY MASS INDEX: 29.62 KG/M2 | DIASTOLIC BLOOD PRESSURE: 78 MMHG | WEIGHT: 200 LBS | OXYGEN SATURATION: 98 % | HEIGHT: 69 IN

## 2025-08-21 DIAGNOSIS — I48.92 ATRIAL FLUTTER, UNSPECIFIED TYPE (MULTI): Primary | ICD-10-CM

## 2025-08-21 DIAGNOSIS — F32.A CHRONIC DEPRESSION: ICD-10-CM

## 2025-08-21 DIAGNOSIS — I48.92 PAROXYSMAL ATRIAL FLUTTER (MULTI): ICD-10-CM

## 2025-08-21 DIAGNOSIS — E03.9 HYPOTHYROIDISM, UNSPECIFIED TYPE: ICD-10-CM

## 2025-08-21 DIAGNOSIS — D12.6 ADENOMATOUS POLYP OF COLON, UNSPECIFIED PART OF COLON: ICD-10-CM

## 2025-08-21 DIAGNOSIS — G47.33 OBSTRUCTIVE SLEEP APNEA: ICD-10-CM

## 2025-08-21 DIAGNOSIS — I10 BENIGN ESSENTIAL HYPERTENSION: ICD-10-CM

## 2025-08-21 DIAGNOSIS — Z00.00 ROUTINE GENERAL MEDICAL EXAMINATION AT HEALTH CARE FACILITY: Primary | ICD-10-CM

## 2025-08-21 DIAGNOSIS — E11.8 CONTROLLED TYPE 2 DIABETES MELLITUS WITH COMPLICATION, WITHOUT LONG-TERM CURRENT USE OF INSULIN (MULTI): ICD-10-CM

## 2025-08-21 DIAGNOSIS — R00.2 PALPITATIONS: ICD-10-CM

## 2025-08-21 LAB — POC HEMOGLOBIN A1C: 6.9 % (ref 4.2–6.5)

## 2025-08-21 PROCEDURE — 1160F RVW MEDS BY RX/DR IN RCRD: CPT | Performed by: FAMILY MEDICINE

## 2025-08-21 PROCEDURE — 1170F FXNL STATUS ASSESSED: CPT | Performed by: FAMILY MEDICINE

## 2025-08-21 PROCEDURE — G0439 PPPS, SUBSEQ VISIT: HCPCS | Performed by: FAMILY MEDICINE

## 2025-08-21 PROCEDURE — 1159F MED LIST DOCD IN RCRD: CPT | Performed by: FAMILY MEDICINE

## 2025-08-21 PROCEDURE — 83036 HEMOGLOBIN GLYCOSYLATED A1C: CPT | Performed by: FAMILY MEDICINE

## 2025-08-21 PROCEDURE — 3008F BODY MASS INDEX DOCD: CPT | Performed by: FAMILY MEDICINE

## 2025-08-21 PROCEDURE — 99214 OFFICE O/P EST MOD 30 MIN: CPT | Performed by: FAMILY MEDICINE

## 2025-08-21 PROCEDURE — 3044F HG A1C LEVEL LT 7.0%: CPT | Performed by: FAMILY MEDICINE

## 2025-08-21 PROCEDURE — 3078F DIAST BP <80 MM HG: CPT | Performed by: FAMILY MEDICINE

## 2025-08-21 PROCEDURE — 3074F SYST BP LT 130 MM HG: CPT | Performed by: FAMILY MEDICINE

## 2025-08-21 ASSESSMENT — ACTIVITIES OF DAILY LIVING (ADL)
GROCERY_SHOPPING: INDEPENDENT
DRESSING: INDEPENDENT
DOING_HOUSEWORK: INDEPENDENT
BATHING: INDEPENDENT
TAKING_MEDICATION: INDEPENDENT
MANAGING_FINANCES: INDEPENDENT

## 2025-08-21 ASSESSMENT — PATIENT HEALTH QUESTIONNAIRE - PHQ9
1. LITTLE INTEREST OR PLEASURE IN DOING THINGS: NEARLY EVERY DAY
SUM OF ALL RESPONSES TO PHQ9 QUESTIONS 1 AND 2: 6
2. FEELING DOWN, DEPRESSED OR HOPELESS: NEARLY EVERY DAY

## 2025-09-03 ENCOUNTER — APPOINTMENT (OUTPATIENT)
Dept: CARDIOLOGY | Facility: HOSPITAL | Age: 65
End: 2025-09-03
Payer: MEDICARE

## 2025-11-25 ENCOUNTER — APPOINTMENT (OUTPATIENT)
Dept: PRIMARY CARE | Facility: CLINIC | Age: 65
End: 2025-11-25
Payer: MEDICARE

## (undated) DEVICE — CATHETER, ABLATION, SAFIRE, 7FR/4MM, LRG CURL, BI-DIRECTIONAL

## (undated) DEVICE — CATHETER, ELCTROPHYSIOLOGY, STEERABLE, DUO DEC, 20 ELECTRODE, 2-5-2 MM SPACING, SUPER LARGE CURL, 7 FR X 95 CM

## (undated) DEVICE — INTRODUCER, SHEATH, FAST-CATH, 7FR X 12CM, C-LOCK

## (undated) DEVICE — Device

## (undated) DEVICE — CATHETER, EP QUADRIPOLAR, IBI, 6FR X 110CM, STEERABLE, 2-5-2MM, LARGE CURVE

## (undated) DEVICE — ELECTRODE KIT, ENSITE X EP SYSTEM SURFACE